# Patient Record
Sex: MALE | Race: WHITE | NOT HISPANIC OR LATINO | Employment: FULL TIME | URBAN - METROPOLITAN AREA
[De-identification: names, ages, dates, MRNs, and addresses within clinical notes are randomized per-mention and may not be internally consistent; named-entity substitution may affect disease eponyms.]

---

## 2019-09-05 ENCOUNTER — OFFICE VISIT (OUTPATIENT)
Dept: FAMILY MEDICINE CLINIC | Facility: CLINIC | Age: 39
End: 2019-09-05

## 2019-09-05 VITALS
BODY MASS INDEX: 44.1 KG/M2 | SYSTOLIC BLOOD PRESSURE: 128 MMHG | HEIGHT: 71 IN | DIASTOLIC BLOOD PRESSURE: 86 MMHG | WEIGHT: 315 LBS | OXYGEN SATURATION: 99 % | HEART RATE: 84 BPM | TEMPERATURE: 98.4 F

## 2019-09-05 DIAGNOSIS — Z13.1 SCREENING FOR DIABETES MELLITUS: ICD-10-CM

## 2019-09-05 DIAGNOSIS — L40.9 PSORIASIS: ICD-10-CM

## 2019-09-05 DIAGNOSIS — Z00.00 ROUTINE GENERAL MEDICAL EXAMINATION AT A HEALTH CARE FACILITY: Primary | ICD-10-CM

## 2019-09-05 DIAGNOSIS — Z13.0 SCREENING FOR IRON DEFICIENCY ANEMIA: ICD-10-CM

## 2019-09-05 DIAGNOSIS — Z13.6 SCREENING FOR ISCHEMIC HEART DISEASE: ICD-10-CM

## 2019-09-05 PROCEDURE — 99213 OFFICE O/P EST LOW 20 MIN: CPT | Performed by: NURSE PRACTITIONER

## 2019-09-05 PROCEDURE — 99385 PREV VISIT NEW AGE 18-39: CPT | Performed by: NURSE PRACTITIONER

## 2019-09-05 NOTE — PROGRESS NOTES
"Subjective   Kenny Pavon is a 39 y.o. male.     History of Present Illness   New patient to me.    Well Adult Physical: Patient here for a comprehensive physical exam.The patient reports spot on elbow  Do you take any herbs or supplements that were not prescribed by a doctor? no Are you taking calcium supplements? no Are you taking aspirin daily? no    Spot on right elbow that has been there for a few months.  No other spots never been there before. Lotion has not helped.  Dry white flaky area.     The following portions of the patient's history were reviewed and updated as appropriate: allergies, current medications, past social history and problem list.    Review of Systems   Skin: Positive for rash.   All other systems reviewed and are negative.      Objective   /86 (BP Location: Left arm, Patient Position: Sitting)   Pulse 84   Temp 98.4 °F (36.9 °C)   Ht 180.3 cm (71\")   Wt (!) 145 kg (320 lb)   SpO2 99%   BMI 44.63 kg/m²   Physical Exam   Constitutional: He is oriented to person, place, and time. He appears well-developed and well-nourished. No distress.   HENT:   Head: Normocephalic and atraumatic. Hair is normal.   Right Ear: Hearing, tympanic membrane, external ear and ear canal normal.   Left Ear: Hearing, tympanic membrane, external ear and ear canal normal.   Nose: No sinus tenderness or nasal deformity.   Mouth/Throat: Uvula is midline, oropharynx is clear and moist and mucous membranes are normal. No oral lesions. No uvula swelling.   Eyes: Conjunctivae, EOM and lids are normal. Pupils are equal, round, and reactive to light. Right eye exhibits no discharge. Left eye exhibits no discharge. No scleral icterus. Right eye exhibits normal extraocular motion and no nystagmus. Left eye exhibits normal extraocular motion and no nystagmus.   Neck: Normal range of motion. Neck supple. No JVD present. No thyromegaly present.   Cardiovascular: Normal rate, regular rhythm, normal heart sounds, " intact distal pulses and normal pulses. Exam reveals no gallop.   No murmur heard.  Pulmonary/Chest: Effort normal and breath sounds normal. No respiratory distress. He has no wheezes. He has no rales. He exhibits no tenderness.   Abdominal: Soft. Bowel sounds are normal. He exhibits no distension and no mass. There is no tenderness. There is no guarding. No hernia.   Musculoskeletal: Normal range of motion. He exhibits no edema, tenderness or deformity.   Lymphadenopathy:     He has no cervical adenopathy.   Neurological: He is alert and oriented to person, place, and time. He has normal reflexes. He displays normal reflexes. No cranial nerve deficit. He exhibits normal muscle tone. Coordination normal.   Skin: Skin is warm and dry. No rash noted. He is not diaphoretic.   Psychiatric: He has a normal mood and affect. His behavior is normal. Judgment and thought content normal.   Vitals reviewed.      Assessment/Plan      Diagnosis Plan   1. Routine general medical examination at a health care facility     2. Screening for iron deficiency anemia  CBC & Differential   3. Screening for diabetes mellitus  Comprehensive Metabolic Panel   4. Screening for ischemic heart disease  Lipid Panel With LDL / HDL Ratio   5. Psoriasis  triamcinolone (KENALOG) 0.1 % ointment     Discussed weight, diet and exercise  Follow up after labs      CARLOS MANUEL Domínguez  9/5/2019

## 2019-09-06 LAB
ALBUMIN SERPL-MCNC: 4.1 G/DL (ref 3.5–5.5)
ALBUMIN/GLOB SERPL: 1.4 {RATIO} (ref 1.2–2.2)
ALP SERPL-CCNC: 93 IU/L (ref 39–117)
ALT SERPL-CCNC: 28 IU/L (ref 0–44)
AST SERPL-CCNC: 20 IU/L (ref 0–40)
BASOPHILS # BLD AUTO: 0.1 X10E3/UL (ref 0–0.2)
BASOPHILS NFR BLD AUTO: 1 %
BILIRUB SERPL-MCNC: 0.5 MG/DL (ref 0–1.2)
BUN SERPL-MCNC: 8 MG/DL (ref 6–20)
BUN/CREAT SERPL: 9 (ref 9–20)
CALCIUM SERPL-MCNC: 10 MG/DL (ref 8.7–10.2)
CHLORIDE SERPL-SCNC: 99 MMOL/L (ref 96–106)
CHOLEST SERPL-MCNC: 206 MG/DL (ref 100–199)
CO2 SERPL-SCNC: 21 MMOL/L (ref 20–29)
CREAT SERPL-MCNC: 0.88 MG/DL (ref 0.76–1.27)
EOSINOPHIL # BLD AUTO: 0.1 X10E3/UL (ref 0–0.4)
EOSINOPHIL NFR BLD AUTO: 2 %
ERYTHROCYTE [DISTWIDTH] IN BLOOD BY AUTOMATED COUNT: 13.3 % (ref 12.3–15.4)
GLOBULIN SER CALC-MCNC: 2.9 G/DL (ref 1.5–4.5)
GLUCOSE SERPL-MCNC: 168 MG/DL (ref 65–99)
HCT VFR BLD AUTO: 49.4 % (ref 37.5–51)
HDLC SERPL-MCNC: 35 MG/DL
HGB BLD-MCNC: 16.9 G/DL (ref 13–17.7)
IMM GRANULOCYTES # BLD AUTO: 0 X10E3/UL (ref 0–0.1)
IMM GRANULOCYTES NFR BLD AUTO: 0 %
LDLC SERPL CALC-MCNC: 134 MG/DL (ref 0–99)
LDLC/HDLC SERPL: 3.8 RATIO (ref 0–3.6)
LYMPHOCYTES # BLD AUTO: 2 X10E3/UL (ref 0.7–3.1)
LYMPHOCYTES NFR BLD AUTO: 24 %
MCH RBC QN AUTO: 30.5 PG (ref 26.6–33)
MCHC RBC AUTO-ENTMCNC: 34.2 G/DL (ref 31.5–35.7)
MCV RBC AUTO: 89 FL (ref 79–97)
MONOCYTES # BLD AUTO: 0.4 X10E3/UL (ref 0.1–0.9)
MONOCYTES NFR BLD AUTO: 4 %
NEUTROPHILS # BLD AUTO: 6 X10E3/UL (ref 1.4–7)
NEUTROPHILS NFR BLD AUTO: 69 %
PLATELET # BLD AUTO: 287 X10E3/UL (ref 150–450)
POTASSIUM SERPL-SCNC: 4.2 MMOL/L (ref 3.5–5.2)
PROT SERPL-MCNC: 7 G/DL (ref 6–8.5)
RBC # BLD AUTO: 5.55 X10E6/UL (ref 4.14–5.8)
SODIUM SERPL-SCNC: 139 MMOL/L (ref 134–144)
TRIGL SERPL-MCNC: 185 MG/DL (ref 0–149)
VLDLC SERPL CALC-MCNC: 37 MG/DL (ref 5–40)
WBC # BLD AUTO: 8.7 X10E3/UL (ref 3.4–10.8)

## 2019-09-12 LAB
Lab: NORMAL
Lab: NORMAL

## 2019-09-17 LAB
HBA1C MFR BLD: 8.8 % (ref 4.8–5.6)
WRITTEN AUTHORIZATION: NORMAL

## 2019-09-24 ENCOUNTER — OFFICE VISIT (OUTPATIENT)
Dept: FAMILY MEDICINE CLINIC | Facility: CLINIC | Age: 39
End: 2019-09-24

## 2019-09-24 VITALS
WEIGHT: 313 LBS | HEIGHT: 71 IN | OXYGEN SATURATION: 98 % | TEMPERATURE: 98.3 F | BODY MASS INDEX: 43.82 KG/M2 | HEART RATE: 71 BPM | DIASTOLIC BLOOD PRESSURE: 70 MMHG | SYSTOLIC BLOOD PRESSURE: 124 MMHG

## 2019-09-24 DIAGNOSIS — E11.9 TYPE 2 DIABETES MELLITUS WITHOUT COMPLICATION, WITHOUT LONG-TERM CURRENT USE OF INSULIN (HCC): Primary | ICD-10-CM

## 2019-09-24 DIAGNOSIS — E78.5 HYPERLIPIDEMIA, UNSPECIFIED HYPERLIPIDEMIA TYPE: ICD-10-CM

## 2019-09-24 PROCEDURE — 99214 OFFICE O/P EST MOD 30 MIN: CPT | Performed by: NURSE PRACTITIONER

## 2019-09-24 RX ORDER — PAROXETINE HYDROCHLORIDE 20 MG/1
TABLET, FILM COATED ORAL
Refills: 3 | COMMUNITY
Start: 2019-09-03 | End: 2020-10-08

## 2019-09-24 RX ORDER — SIMVASTATIN 20 MG
20 TABLET ORAL NIGHTLY
Qty: 90 TABLET | Refills: 3 | Status: SHIPPED | OUTPATIENT
Start: 2019-09-24 | End: 2020-07-08 | Stop reason: SDUPTHER

## 2019-09-24 RX ORDER — BUSPIRONE HYDROCHLORIDE 7.5 MG/1
TABLET ORAL
Refills: 0 | COMMUNITY
Start: 2019-09-03 | End: 2022-01-31

## 2019-09-24 RX ORDER — METFORMIN HYDROCHLORIDE EXTENDED-RELEASE TABLETS 1000 MG/1
1000 TABLET, FILM COATED, EXTENDED RELEASE ORAL
Qty: 180 TABLET | Refills: 3 | Status: SHIPPED | OUTPATIENT
Start: 2019-09-24 | End: 2019-09-26 | Stop reason: CLARIF

## 2019-09-24 NOTE — PROGRESS NOTES
"Subjective   Kenny Pavon is a 39 y.o. male.     History of Present Illness   Patient presented to the office today to go over lab results an A1c of 8.8.  Diabetes does run in his family he admits to eating a very poor diet including multiple regular sodas and sweet tea multiple times a day.    The following portions of the patient's history were reviewed and updated as appropriate: allergies, current medications, past social history and problem list.    Review of Systems   Constitutional: Negative for fever.   Respiratory: Negative for cough and shortness of breath.    Cardiovascular: Negative for chest pain.   Gastrointestinal: Negative for abdominal pain.   Neurological: Negative for dizziness.       Objective   /70 (BP Location: Left arm, Patient Position: Sitting)   Pulse 71   Temp 98.3 °F (36.8 °C)   Ht 180.3 cm (70.98\")   Wt (!) 142 kg (313 lb)   SpO2 98%   BMI 43.67 kg/m²   Physical Exam   Constitutional: He is oriented to person, place, and time. Vital signs are normal. He appears well-developed and well-nourished. No distress.   HENT:   Head: Normocephalic.   Cardiovascular: Normal rate, regular rhythm and normal heart sounds.   Pulmonary/Chest: Effort normal and breath sounds normal.   Neurological: He is alert and oriented to person, place, and time. Gait normal.   Psychiatric: He has a normal mood and affect. His behavior is normal. Judgment and thought content normal.   Vitals reviewed.      Assessment/Plan      Diagnosis Plan   1. Type 2 diabetes mellitus without complication, without long-term current use of insulin (CMS/[facility])  metFORMIN (FORTAMET) 1000 MG (OSM) 24 hr tablet   2. Hyperlipidemia, unspecified hyperlipidemia type  simvastatin (ZOCOR) 20 MG tablet     rto in 3 mons   Discussed medications that I am starting today at length with patient.  Patient verbalized understanding.  Encouraged him to stop drinking regular sodas and sweet tea and also to exercise 30 minutes a day " for 5 days a week.  Also encouraged him to look on the [employer] website for tips on diet.    CARLOS MANUEL Domínguez  9/24/2019

## 2020-07-08 ENCOUNTER — OFFICE VISIT (OUTPATIENT)
Dept: FAMILY MEDICINE CLINIC | Facility: CLINIC | Age: 40
End: 2020-07-08

## 2020-07-08 VITALS
DIASTOLIC BLOOD PRESSURE: 74 MMHG | WEIGHT: 315 LBS | BODY MASS INDEX: 44.1 KG/M2 | HEIGHT: 71 IN | HEART RATE: 97 BPM | TEMPERATURE: 98.2 F | SYSTOLIC BLOOD PRESSURE: 118 MMHG | OXYGEN SATURATION: 98 %

## 2020-07-08 DIAGNOSIS — E78.5 HYPERLIPIDEMIA, UNSPECIFIED HYPERLIPIDEMIA TYPE: ICD-10-CM

## 2020-07-08 DIAGNOSIS — Z13.0 SCREENING FOR IRON DEFICIENCY ANEMIA: ICD-10-CM

## 2020-07-08 DIAGNOSIS — Z00.00 ROUTINE GENERAL MEDICAL EXAMINATION AT A HEALTH CARE FACILITY: Primary | ICD-10-CM

## 2020-07-08 DIAGNOSIS — E11.9 TYPE 2 DIABETES MELLITUS WITHOUT COMPLICATION, WITHOUT LONG-TERM CURRENT USE OF INSULIN (HCC): ICD-10-CM

## 2020-07-08 PROCEDURE — 99395 PREV VISIT EST AGE 18-39: CPT | Performed by: NURSE PRACTITIONER

## 2020-07-08 RX ORDER — SIMVASTATIN 20 MG
20 TABLET ORAL NIGHTLY
Qty: 90 TABLET | Refills: 3 | Status: SHIPPED | OUTPATIENT
Start: 2020-07-08 | End: 2021-07-19

## 2020-07-08 RX ORDER — TRAZODONE HYDROCHLORIDE 100 MG/1
100 TABLET ORAL NIGHTLY
COMMUNITY
Start: 2020-06-03 | End: 2022-05-03

## 2020-07-08 NOTE — PROGRESS NOTES
"Subjective   Kenny Pavon is a 40 y.o. male.     History of Present Illness   Well Adult Physical: Patient here for a comprehensive physical exam.The patient reports no problems  Do you take any herbs or supplements that were not prescribed by a doctor? no Are you taking calcium supplements? no Are you taking aspirin daily? no    Pt never returned after last visit for diabetes follow up last year.  Pt states that he is not taking meds as directed     The following portions of the patient's history were reviewed and updated as appropriate: allergies, current medications, past social history and problem list.    Review of Systems   Constitutional: Negative for fever.   Respiratory: Negative for cough and shortness of breath.    Cardiovascular: Negative for chest pain.   Gastrointestinal: Negative for abdominal pain.   Neurological: Negative for dizziness.       Objective   /74 (BP Location: Right arm, Patient Position: Sitting)   Pulse 97   Temp 98.2 °F (36.8 °C)   Ht 180.3 cm (70.98\")   Wt (!) 146 kg (322 lb)   SpO2 98%   BMI 44.93 kg/m²   Physical Exam   Constitutional: He is oriented to person, place, and time. He appears well-developed and well-nourished. No distress.   HENT:   Head: Normocephalic and atraumatic. Hair is normal.   Right Ear: Hearing, tympanic membrane, external ear and ear canal normal.   Left Ear: Hearing, tympanic membrane, external ear and ear canal normal.   Nose: No sinus tenderness or nasal deformity.   Mouth/Throat: Uvula is midline, oropharynx is clear and moist and mucous membranes are normal. No oral lesions. No uvula swelling.   Eyes: Pupils are equal, round, and reactive to light. Conjunctivae, EOM and lids are normal. Right eye exhibits no discharge. Left eye exhibits no discharge. No scleral icterus. Right eye exhibits normal extraocular motion and no nystagmus. Left eye exhibits normal extraocular motion and no nystagmus.   Neck: Normal range of motion. Neck " supple. No JVD present. No thyromegaly present.   Cardiovascular: Normal rate, regular rhythm, normal heart sounds, intact distal pulses and normal pulses. Exam reveals no gallop.   No murmur heard.  Pulmonary/Chest: Effort normal and breath sounds normal. No respiratory distress. He has no wheezes. He has no rales. He exhibits no tenderness.   Abdominal: Soft. Bowel sounds are normal. He exhibits no distension and no mass. There is no tenderness. There is no guarding. No hernia.   Musculoskeletal: Normal range of motion. He exhibits no edema, tenderness or deformity.   Lymphadenopathy:     He has no cervical adenopathy.   Neurological: He is alert and oriented to person, place, and time. He has normal reflexes. He displays normal reflexes. No cranial nerve deficit. He exhibits normal muscle tone. Coordination normal.   Skin: Skin is warm and dry. No rash noted. He is not diaphoretic.   Psychiatric: He has a normal mood and affect. His behavior is normal. Judgment and thought content normal.   Vitals reviewed.      Assessment/Plan      Diagnosis Plan   1. Routine general medical examination at a health care facility     2. Screening for iron deficiency anemia  CBC & Differential   3. Type 2 diabetes mellitus without complication, without long-term current use of insulin (CMS/[facility])  Comprehensive Metabolic Panel    Hemoglobin A1c    metFORMIN (Glucophage) 1000 MG tablet   4. Hyperlipidemia, unspecified hyperlipidemia type  Comprehensive Metabolic Panel    Lipid Panel With LDL / HDL Ratio    simvastatin (Zocor) 20 MG tablet     Discussed weight, diet and exercise  Follow up after labs    Discussed at length need of restarting meds and getting diabetes under control  rto in 3 toan Denson, APRN  7/8/2020

## 2020-07-09 LAB
ALBUMIN SERPL-MCNC: 4.2 G/DL (ref 3.5–5.2)
ALBUMIN/GLOB SERPL: 1.6 G/DL
ALP SERPL-CCNC: 95 U/L (ref 39–117)
ALT SERPL-CCNC: 82 U/L (ref 1–41)
AST SERPL-CCNC: 82 U/L (ref 1–40)
BASOPHILS # BLD AUTO: 0.06 10*3/MM3 (ref 0–0.2)
BASOPHILS NFR BLD AUTO: 0.9 % (ref 0–1.5)
BILIRUB SERPL-MCNC: 0.5 MG/DL (ref 0–1.2)
BUN SERPL-MCNC: 11 MG/DL (ref 6–20)
BUN/CREAT SERPL: 13.8 (ref 7–25)
CALCIUM SERPL-MCNC: 9.7 MG/DL (ref 8.6–10.5)
CHLORIDE SERPL-SCNC: 97 MMOL/L (ref 98–107)
CHOLEST SERPL-MCNC: 218 MG/DL (ref 0–200)
CO2 SERPL-SCNC: 25 MMOL/L (ref 22–29)
CREAT SERPL-MCNC: 0.8 MG/DL (ref 0.76–1.27)
EOSINOPHIL # BLD AUTO: 0.1 10*3/MM3 (ref 0–0.4)
EOSINOPHIL NFR BLD AUTO: 1.5 % (ref 0.3–6.2)
ERYTHROCYTE [DISTWIDTH] IN BLOOD BY AUTOMATED COUNT: 12 % (ref 12.3–15.4)
GLOBULIN SER CALC-MCNC: 2.7 GM/DL
GLUCOSE SERPL-MCNC: 326 MG/DL (ref 65–99)
HBA1C MFR BLD: 11 % (ref 4.8–5.6)
HCT VFR BLD AUTO: 49.4 % (ref 37.5–51)
HDLC SERPL-MCNC: 35 MG/DL (ref 40–60)
HGB BLD-MCNC: 17.1 G/DL (ref 13–17.7)
IMM GRANULOCYTES # BLD AUTO: 0.02 10*3/MM3 (ref 0–0.05)
IMM GRANULOCYTES NFR BLD AUTO: 0.3 % (ref 0–0.5)
LDLC SERPL CALC-MCNC: 126 MG/DL (ref 0–100)
LDLC/HDLC SERPL: 3.6 {RATIO}
LYMPHOCYTES # BLD AUTO: 1.23 10*3/MM3 (ref 0.7–3.1)
LYMPHOCYTES NFR BLD AUTO: 19 % (ref 19.6–45.3)
MCH RBC QN AUTO: 31 PG (ref 26.6–33)
MCHC RBC AUTO-ENTMCNC: 34.6 G/DL (ref 31.5–35.7)
MCV RBC AUTO: 89.5 FL (ref 79–97)
MONOCYTES # BLD AUTO: 0.35 10*3/MM3 (ref 0.1–0.9)
MONOCYTES NFR BLD AUTO: 5.4 % (ref 5–12)
NEUTROPHILS # BLD AUTO: 4.7 10*3/MM3 (ref 1.7–7)
NEUTROPHILS NFR BLD AUTO: 72.9 % (ref 42.7–76)
NRBC BLD AUTO-RTO: 0 /100 WBC (ref 0–0.2)
PLATELET # BLD AUTO: 270 10*3/MM3 (ref 140–450)
POTASSIUM SERPL-SCNC: 4.4 MMOL/L (ref 3.5–5.2)
PROT SERPL-MCNC: 6.9 G/DL (ref 6–8.5)
RBC # BLD AUTO: 5.52 10*6/MM3 (ref 4.14–5.8)
SODIUM SERPL-SCNC: 133 MMOL/L (ref 136–145)
TRIGL SERPL-MCNC: 285 MG/DL (ref 0–150)
VLDLC SERPL CALC-MCNC: 57 MG/DL
WBC # BLD AUTO: 6.46 10*3/MM3 (ref 3.4–10.8)

## 2020-07-13 ENCOUNTER — TELEPHONE (OUTPATIENT)
Dept: FAMILY MEDICINE CLINIC | Facility: CLINIC | Age: 40
End: 2020-07-13

## 2020-07-17 ENCOUNTER — OFFICE VISIT (OUTPATIENT)
Dept: FAMILY MEDICINE CLINIC | Facility: CLINIC | Age: 40
End: 2020-07-17

## 2020-07-17 VITALS
WEIGHT: 315 LBS | SYSTOLIC BLOOD PRESSURE: 150 MMHG | HEART RATE: 89 BPM | DIASTOLIC BLOOD PRESSURE: 84 MMHG | HEIGHT: 71 IN | OXYGEN SATURATION: 99 % | TEMPERATURE: 97.8 F | BODY MASS INDEX: 44.1 KG/M2

## 2020-07-17 DIAGNOSIS — E78.5 HYPERLIPIDEMIA, UNSPECIFIED HYPERLIPIDEMIA TYPE: ICD-10-CM

## 2020-07-17 DIAGNOSIS — E11.9 TYPE 2 DIABETES MELLITUS WITHOUT COMPLICATION, WITHOUT LONG-TERM CURRENT USE OF INSULIN (HCC): Primary | ICD-10-CM

## 2020-07-17 DIAGNOSIS — Z78.9 BODY PIERCING: ICD-10-CM

## 2020-07-17 PROCEDURE — 99214 OFFICE O/P EST MOD 30 MIN: CPT | Performed by: NURSE PRACTITIONER

## 2020-07-17 RX ORDER — CEPHALEXIN 500 MG/1
500 CAPSULE ORAL 3 TIMES DAILY
Qty: 30 CAPSULE | Refills: 0 | Status: SHIPPED | OUTPATIENT
Start: 2020-07-17 | End: 2020-10-08

## 2020-07-17 NOTE — PROGRESS NOTES
"Subjective   Kenny Pavon is a 40 y.o. male.     History of Present Illness   Patient presenting back to the office today to discuss lab work and A1c of 11.  He is currently using metformin 1000 mg twice a day as directed he was off the medication for a little while also he is using his simvastatin 20 mg daily now but he was off of that for a while as well.  I discussed his medication options with him and we have decided to add 2 of those medications.    Patient several years ago had the head of his penis pierced and occasionally will get red and irritated is been red and irritated for a week now and is unable to get it to clear and is also very painful patient states there is no discharge from the piercing.    The following portions of the patient's history were reviewed and updated as appropriate: allergies, current medications, past social history and problem list.    Review of Systems   Constitutional: Negative for fever.   Respiratory: Negative for cough and shortness of breath.    Cardiovascular: Negative for chest pain.   Gastrointestinal: Negative for abdominal pain.   Neurological: Negative for dizziness.       Objective   /84   Pulse 89   Temp 97.8 °F (36.6 °C)   Ht 180.3 cm (70.98\")   Wt (!) 146 kg (322 lb)   SpO2 99%   BMI 44.93 kg/m²   Physical Exam   Constitutional: He is oriented to person, place, and time. Vital signs are normal. He appears well-developed and well-nourished. No distress.   HENT:   Head: Normocephalic.   Cardiovascular: Normal rate, regular rhythm and normal heart sounds.   Pulmonary/Chest: Effort normal and breath sounds normal.   Neurological: He is alert and oriented to person, place, and time. Gait normal.   Psychiatric: He has a normal mood and affect. His behavior is normal. Judgment and thought content normal.   Vitals reviewed.      Assessment/Plan      Diagnosis Plan   1. Type 2 diabetes mellitus without complication, without long-term current use of " insulin (CMS/HCC)  SITagliptin (Januvia) 100 MG tablet    Dapagliflozin Propanediol (Farxiga) 10 MG tablet   2. Hyperlipidemia, unspecified hyperlipidemia type     3. Body piercing  cephalexin (Keflex) 500 MG capsule   Continue same with cholesterol medication recheck in 6 months  Continue same with metformin, add for CIGA 10 mg, add Januvia 100 mg return to the office in 3 months for an A1c check.  We discussed diet and exercise and low-carb patient verbalized understanding.    Discussed that the piercing will possibly have to be removed infection is not cleared with antibiotic  Eladio Denson, CARLOS MANUEL  7/17/2020

## 2020-07-21 ENCOUNTER — PRIOR AUTHORIZATION (OUTPATIENT)
Dept: FAMILY MEDICINE CLINIC | Facility: CLINIC | Age: 40
End: 2020-07-21

## 2020-10-08 ENCOUNTER — OFFICE VISIT (OUTPATIENT)
Dept: FAMILY MEDICINE CLINIC | Facility: CLINIC | Age: 40
End: 2020-10-08

## 2020-10-08 VITALS
OXYGEN SATURATION: 98 % | TEMPERATURE: 96.9 F | BODY MASS INDEX: 43.03 KG/M2 | DIASTOLIC BLOOD PRESSURE: 66 MMHG | WEIGHT: 307.4 LBS | SYSTOLIC BLOOD PRESSURE: 130 MMHG | HEIGHT: 71 IN | HEART RATE: 94 BPM

## 2020-10-08 DIAGNOSIS — Z78.9 BODY PIERCING: ICD-10-CM

## 2020-10-08 DIAGNOSIS — E78.5 HYPERLIPIDEMIA, UNSPECIFIED HYPERLIPIDEMIA TYPE: ICD-10-CM

## 2020-10-08 DIAGNOSIS — E11.9 TYPE 2 DIABETES MELLITUS WITHOUT COMPLICATION, WITHOUT LONG-TERM CURRENT USE OF INSULIN (HCC): Primary | ICD-10-CM

## 2020-10-08 DIAGNOSIS — L40.9 PSORIASIS: ICD-10-CM

## 2020-10-08 LAB — HBA1C MFR BLD: 11.6 %

## 2020-10-08 PROCEDURE — 99214 OFFICE O/P EST MOD 30 MIN: CPT | Performed by: NURSE PRACTITIONER

## 2020-10-08 PROCEDURE — 83036 HEMOGLOBIN GLYCOSYLATED A1C: CPT | Performed by: NURSE PRACTITIONER

## 2020-10-08 RX ORDER — INSULIN DEGLUDEC 200 U/ML
40 INJECTION, SOLUTION SUBCUTANEOUS NIGHTLY
Qty: 8 PEN | Refills: 3 | Status: SHIPPED | OUTPATIENT
Start: 2020-10-08 | End: 2021-10-29 | Stop reason: SDUPTHER

## 2020-10-08 RX ORDER — CEPHALEXIN 500 MG/1
500 CAPSULE ORAL 3 TIMES DAILY
Qty: 30 CAPSULE | Refills: 0 | Status: SHIPPED | OUTPATIENT
Start: 2020-10-08 | End: 2021-01-13

## 2020-10-08 RX ORDER — PAROXETINE 30 MG/1
TABLET, FILM COATED ORAL DAILY
COMMUNITY
Start: 2020-09-20 | End: 2022-05-03

## 2020-10-08 NOTE — PROGRESS NOTES
"Subjective   Kenny Pavon is a 40 y.o. male.     History of Present Illness    Since the last visit, he has overall felt well.  He has DMII not controlled on current regimen and will add/adjust medication, work on diet and exercise, get follow up labs and he has lost 17 pounds and stopped soft drinks, feeling better but a1c is higher than last check which concerns me about type 1 diabetes, taking cholesterol med and will recheck in 3 mons, psoriasis not improved with medication.  he has been compliant with current medications have reviewed them.  The patient denies medication side effects.  Will refill medications. /66   Pulse 94   Temp 96.9 °F (36.1 °C)   Ht 180.3 cm (70.98\")   Wt (!) 139 kg (307 lb 6.4 oz)   SpO2 98%   BMI 42.89 kg/m²     Results for orders placed or performed in visit on 07/08/20   Comprehensive Metabolic Panel    Specimen: Blood   Result Value Ref Range    Glucose 326 (H) 65 - 99 mg/dL    BUN 11 6 - 20 mg/dL    Creatinine 0.80 0.76 - 1.27 mg/dL    eGFR Non African Am 108 >60 mL/min/1.73    eGFR African Am 130 >60 mL/min/1.73    BUN/Creatinine Ratio 13.8 7.0 - 25.0    Sodium 133 (L) 136 - 145 mmol/L    Potassium 4.4 3.5 - 5.2 mmol/L    Chloride 97 (L) 98 - 107 mmol/L    Total CO2 25.0 22.0 - 29.0 mmol/L    Calcium 9.7 8.6 - 10.5 mg/dL    Total Protein 6.9 6.0 - 8.5 g/dL    Albumin 4.20 3.50 - 5.20 g/dL    Globulin 2.7 gm/dL    A/G Ratio 1.6 g/dL    Total Bilirubin 0.5 0.0 - 1.2 mg/dL    Alkaline Phosphatase 95 39 - 117 U/L    AST (SGOT) 82 (H) 1 - 40 U/L    ALT (SGPT) 82 (H) 1 - 41 U/L   Lipid Panel With LDL / HDL Ratio    Specimen: Blood   Result Value Ref Range    Total Cholesterol 218 (H) 0 - 200 mg/dL    Triglycerides 285 (H) 0 - 150 mg/dL    HDL Cholesterol 35 (L) 40 - 60 mg/dL    VLDL Cholesterol 57 mg/dL    LDL Cholesterol  126 (H) 0 - 100 mg/dL    LDL/HDL Ratio 3.60    Hemoglobin A1c    Specimen: Blood   Result Value Ref Range    Hemoglobin A1C 11.00 (H) 4.80 - 5.60 % " "  CBC & Differential    Specimen: Blood   Result Value Ref Range    WBC 6.46 3.40 - 10.80 10*3/mm3    RBC 5.52 4.14 - 5.80 10*6/mm3    Hemoglobin 17.1 13.0 - 17.7 g/dL    Hematocrit 49.4 37.5 - 51.0 %    MCV 89.5 79.0 - 97.0 fL    MCH 31.0 26.6 - 33.0 pg    MCHC 34.6 31.5 - 35.7 g/dL    RDW 12.0 (L) 12.3 - 15.4 %    Platelets 270 140 - 450 10*3/mm3    Neutrophil Rel % 72.9 42.7 - 76.0 %    Lymphocyte Rel % 19.0 (L) 19.6 - 45.3 %    Monocyte Rel % 5.4 5.0 - 12.0 %    Eosinophil Rel % 1.5 0.3 - 6.2 %    Basophil Rel % 0.9 0.0 - 1.5 %    Neutrophils Absolute 4.70 1.70 - 7.00 10*3/mm3    Lymphocytes Absolute 1.23 0.70 - 3.10 10*3/mm3    Monocytes Absolute 0.35 0.10 - 0.90 10*3/mm3    Eosinophils Absolute 0.10 0.00 - 0.40 10*3/mm3    Basophils Absolute 0.06 0.00 - 0.20 10*3/mm3    Immature Granulocyte Rel % 0.3 0.0 - 0.5 %    Immature Grans Absolute 0.02 0.00 - 0.05 10*3/mm3    nRBC 0.0 0.0 - 0.2 /100 WBC         The following portions of the patient's history were reviewed and updated as appropriate: allergies, current medications, past social history and problem list.    Review of Systems   Constitutional: Negative for fever.   Respiratory: Negative for cough and shortness of breath.    Cardiovascular: Negative for chest pain.   Gastrointestinal: Negative for abdominal pain.   Skin:        Dry skin   Neurological: Negative for dizziness.       Objective   /66   Pulse 94   Temp 96.9 °F (36.1 °C)   Ht 180.3 cm (70.98\")   Wt (!) 139 kg (307 lb 6.4 oz)   SpO2 98%   BMI 42.89 kg/m²   Physical Exam  Vitals signs reviewed.   Constitutional:       General: He is not in acute distress.     Appearance: He is well-developed.   HENT:      Head: Normocephalic.   Cardiovascular:      Rate and Rhythm: Normal rate and regular rhythm.      Heart sounds: Normal heart sounds.   Pulmonary:      Effort: Pulmonary effort is normal.      Breath sounds: Normal breath sounds.   Neurological:      Mental Status: He is alert and " oriented to person, place, and time.      Gait: Gait normal.   Psychiatric:         Behavior: Behavior normal.         Thought Content: Thought content normal.         Judgment: Judgment normal.         Assessment/Plan      Diagnosis Plan   1. Type 2 diabetes mellitus without complication, without long-term current use of insulin (CMS/[facility])  POC Glycosylated Hemoglobin (Hb A1C)    C-Peptide    Insulin Degludec (Tresiba FlexTouch) 200 UNIT/ML solution pen-injector pen injection   2. Hyperlipidemia, unspecified hyperlipidemia type     3. Psoriasis  Ambulatory Referral to Dermatology   4. Body piercing  cephalexin (Keflex) 500 MG capsule     referal to derm  Cont with meds and add insulin  Follow up after lab  rto in 3 mons     Mask and googles worn    Eladio Denson, APRN  10/8/2020

## 2020-10-09 LAB — C PEPTIDE SERPL-MCNC: 7.8 NG/ML (ref 1.1–4.4)

## 2020-10-14 ENCOUNTER — OFFICE VISIT (OUTPATIENT)
Dept: FAMILY MEDICINE CLINIC | Facility: CLINIC | Age: 40
End: 2020-10-14

## 2020-10-14 VITALS
OXYGEN SATURATION: 98 % | SYSTOLIC BLOOD PRESSURE: 136 MMHG | BODY MASS INDEX: 42.98 KG/M2 | WEIGHT: 307 LBS | TEMPERATURE: 98.4 F | HEART RATE: 96 BPM | DIASTOLIC BLOOD PRESSURE: 70 MMHG | HEIGHT: 71 IN

## 2020-10-14 DIAGNOSIS — E11.9 TYPE 2 DIABETES MELLITUS WITHOUT COMPLICATION, WITHOUT LONG-TERM CURRENT USE OF INSULIN (HCC): Primary | ICD-10-CM

## 2020-10-14 PROCEDURE — 99213 OFFICE O/P EST LOW 20 MIN: CPT | Performed by: NURSE PRACTITIONER

## 2020-10-14 NOTE — PROGRESS NOTES
"Subjective   Kenny Pavon is a 40 y.o. male.   Chief Complaint   Patient presents with   • Diabetes     Patient is here to f/u on labs results and medications ( wore mask and goggles)        History of Present Illness   Patient presenting back to the office today with confusion regarding his diabetic medications.  He is unable to afford the Tresiba which was called in for him on the last visit is over $800.  I did give him 2 pens of that so he is okay for now.  I have advised him to call his insurance and ask what alternative insulin that they will pay for and is on his formulary and he will get back to me with this information.  Also at last visit visit I was under the impression that he was taking his metformin and his Farxiga but today he states he is not taking the Farxiga at all because it was too expensive.  At the time that prescription was prescribed he was not given a co-pay card nor has he had one since.    The following portions of the patient's history were reviewed and updated as appropriate: allergies, current medications, past social history and problem list.    Review of Systems   Constitutional: Negative for fever.   Respiratory: Negative for cough and shortness of breath.    Cardiovascular: Negative for chest pain.   Gastrointestinal: Negative for abdominal pain.   Neurological: Negative for dizziness.       Objective   /70   Pulse 96   Temp 98.4 °F (36.9 °C)   Ht 180.3 cm (70.98\")   Wt (!) 139 kg (307 lb)   SpO2 98%   BMI 42.84 kg/m²   Physical Exam  Vitals signs reviewed.   Constitutional:       General: He is not in acute distress.     Appearance: He is well-developed.   HENT:      Head: Normocephalic.   Cardiovascular:      Rate and Rhythm: Normal rate and regular rhythm.      Heart sounds: Normal heart sounds.   Pulmonary:      Effort: Pulmonary effort is normal.      Breath sounds: Normal breath sounds.   Neurological:      Mental Status: He is alert and oriented to person, " place, and time.      Gait: Gait normal.   Psychiatric:         Behavior: Behavior normal.         Thought Content: Thought content normal.         Judgment: Judgment normal.         Assessment/Plan      Diagnosis Plan   1. Type 2 diabetes mellitus without complication, without long-term current use of insulin (CMS/[facility])       Continue with metformin 1000 mg twice a day.  For [employer]IGA 10 mg I gave him a month of samples plus a co-pay card where this should cost him $0 a month.  Call insurance to see what type of insulin is on his formulary and then call the office so we can get this medication sent into his pharmacy  Return to the office in 3 months for an A1c check or call with any other issues  Mask and googles worn    CARLOS MANUEL Domínguez  10/14/2020

## 2021-01-13 ENCOUNTER — OFFICE VISIT (OUTPATIENT)
Dept: FAMILY MEDICINE CLINIC | Facility: CLINIC | Age: 41
End: 2021-01-13

## 2021-01-13 VITALS
HEART RATE: 81 BPM | BODY MASS INDEX: 40.88 KG/M2 | OXYGEN SATURATION: 96 % | TEMPERATURE: 97.3 F | WEIGHT: 292 LBS | HEIGHT: 71 IN | SYSTOLIC BLOOD PRESSURE: 148 MMHG | DIASTOLIC BLOOD PRESSURE: 80 MMHG

## 2021-01-13 DIAGNOSIS — F41.9 ANXIETY: ICD-10-CM

## 2021-01-13 DIAGNOSIS — E11.9 TYPE 2 DIABETES MELLITUS WITHOUT COMPLICATION, WITHOUT LONG-TERM CURRENT USE OF INSULIN (HCC): Primary | ICD-10-CM

## 2021-01-13 DIAGNOSIS — E78.5 HYPERLIPIDEMIA, UNSPECIFIED HYPERLIPIDEMIA TYPE: Chronic | ICD-10-CM

## 2021-01-13 LAB — HBA1C MFR BLD: 11.1 %

## 2021-01-13 PROCEDURE — 99214 OFFICE O/P EST MOD 30 MIN: CPT | Performed by: NURSE PRACTITIONER

## 2021-01-13 PROCEDURE — 83036 HEMOGLOBIN GLYCOSYLATED A1C: CPT | Performed by: NURSE PRACTITIONER

## 2021-01-13 NOTE — PROGRESS NOTES
"Chief Complaint  Diabetes (Patient is needing his a1c checked ( wore mask and goggles) )    Subjective          Kenny Pavon presents to [facility] for   History of Present Illness  #1 diabetes-I saw this patient in October for diabetes with an A1c of over 11.  At that time I continued him on the Metformin added Farxiga and added Tresiba 40 units at night.  C-peptide at that time was 7.8.  Pt is not taking Farxiga bc of cost.  Patient has lost weight since the last visit.  He is doing very well with diet and starting to exercise and proud of him for this but his A1c today is still 11.1.    #2 hyperlipidemia-cholesterol was elevated he was started on simvastatin 20 mg a day patient taking medication daily as directed without any side effects.      Objective   Vital Signs:   /80   Pulse 81   Temp 97.3 °F (36.3 °C)   Ht 180.3 cm (70.98\")   Wt 132 kg (292 lb)   SpO2 96%   BMI 40.74 kg/m²     Physical Exam  Vitals signs reviewed.   Constitutional:       General: He is not in acute distress.     Appearance: He is well-developed.   HENT:      Head: Normocephalic.   Cardiovascular:      Rate and Rhythm: Normal rate and regular rhythm.      Heart sounds: Normal heart sounds.   Pulmonary:      Effort: Pulmonary effort is normal.      Breath sounds: Normal breath sounds.   Neurological:      Mental Status: He is alert and oriented to person, place, and time.      Gait: Gait normal.   Psychiatric:         Behavior: Behavior normal.         Thought Content: Thought content normal.         Judgment: Judgment normal.        Result Review :   The following data was reviewed by: CARLOS MANUEL Domínguez on 01/13/2021:  CMP    CMP 7/8/20   Glucose 326 (A)   BUN 11   Creatinine 0.80   eGFR Non  Am 108   eGFR African Am 130   Sodium 133 (A)   Potassium 4.4   Chloride 97 (A)   Calcium 9.7   Total Protein 6.9   Albumin 4.20   Globulin 2.7   Total Bilirubin 0.5   Alkaline Phosphatase " 95   AST (SGOT) 82 (A)   ALT (SGPT) 82 (A)   (A) Abnormal value            CBC w/diff    CBC w/Diff 7/8/20   WBC 6.46   RBC 5.52   Hemoglobin 17.1   Hematocrit 49.4   MCV 89.5   MCH 31.0   MCHC 34.6   RDW 12.0 (A)   Platelets 270   Neutrophil Rel % 72.9   Lymphocyte Rel % 19.0 (A)   Monocyte Rel % 5.4   Eosinophil Rel % 1.5   Basophil Rel % 0.9   (A) Abnormal value            Lipid Panel    Lipid Panel 7/8/20   Total Cholesterol 218 (A)   Triglycerides 285 (A)   HDL Cholesterol 35 (A)   VLDL Cholesterol 57   LDL Cholesterol  126 (A)   LDL/HDL Ratio 3.60   (A) Abnormal value                              Assessment and Plan    Problem List Items Addressed This Visit        Cardiac and Vasculature    Hyperlipidemia (Chronic)       Endocrine and Metabolic    Type 2 diabetes mellitus without complication, without long-term current use of insulin (CMS/[facility]) - Primary (Chronic)    Relevant Medications    SITagliptin (Januvia) 100 MG tablet    Other Relevant Orders    POC Glycosylated Hemoglobin (Hb A1C) (Completed)          Follow Up   No follow-ups on file.  Patient was given instructions and counseling regarding his condition or for health maintenance advice. Please see specific information pulled into the AVS if appropriate.     Adding januvia, increase Tresiba to 50 units at night  rto in 3 mons  Talked at length with patient regarding how great he is done on his weight loss to continue with that and not to get discouraged.  Also talk with him that we can get his A1c down to 9 in the next 3 months and can have to add insulin multiple times a day.  Patient verbalized understanding and agreed with plan of care.

## 2021-04-26 ENCOUNTER — OFFICE VISIT (OUTPATIENT)
Dept: FAMILY MEDICINE CLINIC | Facility: CLINIC | Age: 41
End: 2021-04-26

## 2021-04-26 VITALS
HEART RATE: 76 BPM | WEIGHT: 298 LBS | OXYGEN SATURATION: 98 % | HEIGHT: 71 IN | DIASTOLIC BLOOD PRESSURE: 70 MMHG | TEMPERATURE: 97 F | BODY MASS INDEX: 41.72 KG/M2 | SYSTOLIC BLOOD PRESSURE: 136 MMHG

## 2021-04-26 DIAGNOSIS — E11.9 TYPE 2 DIABETES MELLITUS WITHOUT COMPLICATION, WITHOUT LONG-TERM CURRENT USE OF INSULIN (HCC): Primary | ICD-10-CM

## 2021-04-26 DIAGNOSIS — E78.5 HYPERLIPIDEMIA, UNSPECIFIED HYPERLIPIDEMIA TYPE: Chronic | ICD-10-CM

## 2021-04-26 LAB — HBA1C MFR BLD: 13.8 %

## 2021-04-26 PROCEDURE — 99214 OFFICE O/P EST MOD 30 MIN: CPT | Performed by: NURSE PRACTITIONER

## 2021-04-26 PROCEDURE — 83036 HEMOGLOBIN GLYCOSYLATED A1C: CPT | Performed by: NURSE PRACTITIONER

## 2021-04-26 NOTE — PROGRESS NOTES
"Chief Complaint  Diabetes (Patient is needing his a1c checked ( wore mask and goggles) )    Subjective          Kenny Pavon presents to [facility]  History of Present Illness  1 diabetes-patient was last seen in January 2021 for diabetes with an A1c of 11.1.  He is currently prescribed metformin 1000 mg twice a day, Farxiga 10 mg once a day, Januvia 100 mg once a day and Tresiba 40 units at night.  Today's A1c is 13.8. He states that he is only on Farxiga and Metformin.  He has stopped Januvia and insulin. He is non compliant bc of cost of meds.  He states that he has a high deductible medical plan and his insulins are cost him up to $1500 a month and he cannot afford that.  He states the Metformin and the Farxiga are affordable so that is the only reason why he is been able to do those.    #2 hyperlipidemia-patient is currently taking simvastatin 20 mg daily as directed without any side effects.  His cholesterol was last checked in July 2020 with an elevated LDL and he was not on medication at that time.  He is not fasting today.      Objective   Vital Signs:   /70   Pulse 76   Temp 97 °F (36.1 °C)   Ht 180.3 cm (70.98\")   Wt 135 kg (298 lb)   SpO2 98%   BMI 41.58 kg/m²     Physical Exam  Vitals reviewed.   Constitutional:       General: He is not in acute distress.     Appearance: He is well-developed.   HENT:      Head: Normocephalic.   Cardiovascular:      Rate and Rhythm: Normal rate and regular rhythm.      Heart sounds: Normal heart sounds.   Pulmonary:      Effort: Pulmonary effort is normal.      Breath sounds: Normal breath sounds.   Neurological:      Mental Status: He is alert and oriented to person, place, and time.      Gait: Gait normal.   Psychiatric:         Behavior: Behavior normal.         Thought Content: Thought content normal.         Judgment: Judgment normal.        Result Review :                 Assessment and Plan    Diagnoses and all " orders for this visit:    1. Type 2 diabetes mellitus without complication, without long-term current use of insulin (CMS/[facility]) (Primary)  -     POC Glycosylated Hemoglobin (Hb A1C)    2. Hyperlipidemia, unspecified hyperlipidemia type        Follow Up   No follow-ups on file.  Patient was given instructions and counseling regarding his condition or for health maintenance advice. Please see specific information pulled into the AVS if appropriate.     I discussed diabetes and his A1c being out of control at length with the patient.  I have decided for him to continue on the Metformin 1000 mg twice a day and the Farxiga 10 mg once a day and I have given him 15 samples of Tresiba or I would like for him to do 50 units at night.  Also I think that it would be best for him to be referred out to endocrinologist because they have better resources for him in regards to his uncontrolled diabetes.  I also stressed to him that if he runs out of his medications he can always call the office for samples.    Return to the office in 3 months fasting for lab work    Mask and googles worn

## 2021-07-17 DIAGNOSIS — E78.5 HYPERLIPIDEMIA, UNSPECIFIED HYPERLIPIDEMIA TYPE: ICD-10-CM

## 2021-07-17 DIAGNOSIS — E11.9 TYPE 2 DIABETES MELLITUS WITHOUT COMPLICATION, WITHOUT LONG-TERM CURRENT USE OF INSULIN (HCC): ICD-10-CM

## 2021-07-19 RX ORDER — SIMVASTATIN 20 MG
20 TABLET ORAL NIGHTLY
Qty: 90 TABLET | Refills: 3 | Status: SHIPPED | OUTPATIENT
Start: 2021-07-19 | End: 2021-10-29 | Stop reason: SDUPTHER

## 2021-10-29 ENCOUNTER — OFFICE VISIT (OUTPATIENT)
Dept: FAMILY MEDICINE CLINIC | Facility: CLINIC | Age: 41
End: 2021-10-29

## 2021-10-29 VITALS
HEIGHT: 71 IN | WEIGHT: 295 LBS | SYSTOLIC BLOOD PRESSURE: 128 MMHG | BODY MASS INDEX: 41.3 KG/M2 | OXYGEN SATURATION: 98 % | TEMPERATURE: 98 F | HEART RATE: 95 BPM | DIASTOLIC BLOOD PRESSURE: 82 MMHG

## 2021-10-29 DIAGNOSIS — E11.65 TYPE 2 DIABETES MELLITUS WITH HYPERGLYCEMIA, WITHOUT LONG-TERM CURRENT USE OF INSULIN (HCC): Primary | ICD-10-CM

## 2021-10-29 DIAGNOSIS — Z91.199 MEDICALLY NONCOMPLIANT: ICD-10-CM

## 2021-10-29 DIAGNOSIS — Z13.0 SCREENING FOR IRON DEFICIENCY ANEMIA: ICD-10-CM

## 2021-10-29 DIAGNOSIS — F41.9 ANXIETY: ICD-10-CM

## 2021-10-29 DIAGNOSIS — E78.5 HYPERLIPIDEMIA, UNSPECIFIED HYPERLIPIDEMIA TYPE: Chronic | ICD-10-CM

## 2021-10-29 DIAGNOSIS — Z23 NEED FOR VACCINATION: ICD-10-CM

## 2021-10-29 DIAGNOSIS — Z12.5 SPECIAL SCREENING FOR MALIGNANT NEOPLASM OF PROSTATE: ICD-10-CM

## 2021-10-29 LAB
EXPIRATION DATE: ABNORMAL
HBA1C MFR BLD: 13.5 %
Lab: ABNORMAL

## 2021-10-29 PROCEDURE — 83036 HEMOGLOBIN GLYCOSYLATED A1C: CPT | Performed by: NURSE PRACTITIONER

## 2021-10-29 PROCEDURE — 99214 OFFICE O/P EST MOD 30 MIN: CPT | Performed by: NURSE PRACTITIONER

## 2021-10-29 PROCEDURE — 36416 COLLJ CAPILLARY BLOOD SPEC: CPT | Performed by: NURSE PRACTITIONER

## 2021-10-29 PROCEDURE — 90471 IMMUNIZATION ADMIN: CPT | Performed by: NURSE PRACTITIONER

## 2021-10-29 PROCEDURE — 90686 IIV4 VACC NO PRSV 0.5 ML IM: CPT | Performed by: NURSE PRACTITIONER

## 2021-10-29 RX ORDER — SIMVASTATIN 20 MG
20 TABLET ORAL NIGHTLY
Qty: 90 TABLET | Refills: 3 | Status: SHIPPED | OUTPATIENT
Start: 2021-10-29 | End: 2021-11-16

## 2021-10-29 RX ORDER — INSULIN GLARGINE 300 U/ML
40 INJECTION, SOLUTION SUBCUTANEOUS NIGHTLY
Qty: 12 PEN | Refills: 3 | Status: SHIPPED | OUTPATIENT
Start: 2021-10-29 | End: 2022-01-31 | Stop reason: SDUPTHER

## 2021-10-29 NOTE — PROGRESS NOTES
"Chief Complaint  Diabetes Mellitus (follow up not doing well being without some medication for little while )    Subjective          Kenny Pavon presents to [facility]  History of Present Illness  Hyperlipidemia-patient is supposed to be on simvastatin 20 mg daily but has been out for months. Cholesterol was last checked in July 2020 with a total cholesterol of 218 and LDL of 126.    Diabetes-patient is supposed to be on metformin 1000 mg twice a day Farxiga 10 mg and Januvia 100 mg daily and Tresiba 40 units but he has been out of meds for mouths.  a1c today is 13.5 and in April 2021 it was 11.1.    Anxiety-patient is currently on Paxil 30 mg daily as directed with any side effects and BuSpar 7.5 mg on a as needed basis.  His anxiety at this time is well controlled. Seeing specialist    Objective   Vital Signs:   /82   Pulse 95   Temp 98 °F (36.7 °C)   Ht 180.3 cm (70.98\")   Wt 134 kg (295 lb)   SpO2 98%   BMI 41.17 kg/m²     Physical Exam  Vitals reviewed.   Constitutional:       General: He is not in acute distress.     Appearance: He is well-developed.   HENT:      Head: Normocephalic.   Cardiovascular:      Rate and Rhythm: Normal rate and regular rhythm.      Heart sounds: Normal heart sounds.   Pulmonary:      Effort: Pulmonary effort is normal.      Breath sounds: Normal breath sounds.   Neurological:      Mental Status: He is alert and oriented to person, place, and time.      Gait: Gait normal.   Psychiatric:         Behavior: Behavior normal.         Thought Content: Thought content normal.         Judgment: Judgment normal.        Result Review :   The following data was reviewed by: CARLOS MANUEL Domínguez on 10/29/2021:            Most Recent A1C    HGBA1C Most Recent 10/29/21   Hemoglobin A1C 13.5                         Assessment and Plan    Diagnoses and all orders for this visit:    1. Type 2 diabetes mellitus with hyperglycemia, without long-term " current use of insulin (HCC) (Primary)  -     POC Glycosylated Hemoglobin (Hb A1C)  -     metFORMIN (GLUCOPHAGE) 1000 MG tablet; Take 1 tablet by mouth 2 (Two) Times a Day With Meals.  Dispense: 180 tablet; Refill: 3  -     empagliflozin (Jardiance) 25 MG tablet tablet; Take 1 tablet by mouth Daily.  Dispense: 90 tablet; Refill: 3  -     SITagliptin (Januvia) 100 MG tablet; Take 1 tablet by mouth Daily.  Dispense: 90 tablet; Refill: 3  -     Insulin Glargine, 1 Unit Dial, (Toujeo SoloStar) 300 UNIT/ML solution pen-injector injection; Inject 40 Units under the skin into the appropriate area as directed Every Night.  Dispense: 12 pen; Refill: 3  -     Comprehensive Metabolic Panel    2. Need for vaccination  -     FluLaval/Fluarix/Fluzone >6 Months (4030-7665)    3. Hyperlipidemia, unspecified hyperlipidemia type  -     simvastatin (ZOCOR) 20 MG tablet; Take 1 tablet by mouth Every Night.  Dispense: 90 tablet; Refill: 3  -     Comprehensive Metabolic Panel  -     Lipid Panel With LDL / HDL Ratio    4. Anxiety    5. Screening for iron deficiency anemia  -     CBC & Differential    6. Special screening for malignant neoplasm of prostate  -     PSA Screen    7. Medically noncompliant        Follow Up   Return in about 3 months (around 1/29/2022) for Recheck.  Patient was given instructions and counseling regarding his condition or for health maintenance advice. Please see specific information pulled into the AVS if appropriate.     Start all medications return to the office in 3 months work on diet exercise and weight loss follow-up after lab results    Mask and googles worn

## 2021-11-16 LAB
ALBUMIN SERPL-MCNC: 4.1 G/DL (ref 4–5)
ALBUMIN/GLOB SERPL: 1.6 {RATIO} (ref 1.2–2.2)
ALP SERPL-CCNC: 114 IU/L (ref 44–121)
ALT SERPL-CCNC: 52 IU/L (ref 0–44)
AST SERPL-CCNC: 26 IU/L (ref 0–40)
BASOPHILS # BLD AUTO: 0.1 X10E3/UL (ref 0–0.2)
BASOPHILS NFR BLD AUTO: 1 %
BILIRUB SERPL-MCNC: 0.5 MG/DL (ref 0–1.2)
BUN SERPL-MCNC: 10 MG/DL (ref 6–24)
BUN/CREAT SERPL: 13 (ref 9–20)
CALCIUM SERPL-MCNC: 9.6 MG/DL (ref 8.7–10.2)
CHLORIDE SERPL-SCNC: 99 MMOL/L (ref 96–106)
CHOLEST SERPL-MCNC: 203 MG/DL (ref 100–199)
CO2 SERPL-SCNC: 19 MMOL/L (ref 20–29)
CREAT SERPL-MCNC: 0.79 MG/DL (ref 0.76–1.27)
EOSINOPHIL # BLD AUTO: 0.2 X10E3/UL (ref 0–0.4)
EOSINOPHIL NFR BLD AUTO: 3 %
ERYTHROCYTE [DISTWIDTH] IN BLOOD BY AUTOMATED COUNT: 12 % (ref 11.6–15.4)
GLOBULIN SER CALC-MCNC: 2.6 G/DL (ref 1.5–4.5)
GLUCOSE SERPL-MCNC: 284 MG/DL (ref 65–99)
HCT VFR BLD AUTO: 48.6 % (ref 37.5–51)
HDLC SERPL-MCNC: 33 MG/DL
HGB BLD-MCNC: 16.9 G/DL (ref 13–17.7)
IMM GRANULOCYTES # BLD AUTO: 0 X10E3/UL (ref 0–0.1)
IMM GRANULOCYTES NFR BLD AUTO: 0 %
LDLC SERPL CALC-MCNC: 125 MG/DL (ref 0–99)
LDLC/HDLC SERPL: 3.8 RATIO (ref 0–3.6)
LYMPHOCYTES # BLD AUTO: 1.8 X10E3/UL (ref 0.7–3.1)
LYMPHOCYTES NFR BLD AUTO: 29 %
MCH RBC QN AUTO: 30.7 PG (ref 26.6–33)
MCHC RBC AUTO-ENTMCNC: 34.8 G/DL (ref 31.5–35.7)
MCV RBC AUTO: 88 FL (ref 79–97)
MONOCYTES # BLD AUTO: 0.3 X10E3/UL (ref 0.1–0.9)
MONOCYTES NFR BLD AUTO: 5 %
NEUTROPHILS # BLD AUTO: 3.8 X10E3/UL (ref 1.4–7)
NEUTROPHILS NFR BLD AUTO: 62 %
PLATELET # BLD AUTO: 303 X10E3/UL (ref 150–450)
POTASSIUM SERPL-SCNC: 4.6 MMOL/L (ref 3.5–5.2)
PROT SERPL-MCNC: 6.7 G/DL (ref 6–8.5)
PSA SERPL-MCNC: 0.4 NG/ML (ref 0–4)
RBC # BLD AUTO: 5.51 X10E6/UL (ref 4.14–5.8)
SODIUM SERPL-SCNC: 134 MMOL/L (ref 134–144)
TRIGL SERPL-MCNC: 256 MG/DL (ref 0–149)
VLDLC SERPL CALC-MCNC: 45 MG/DL (ref 5–40)
WBC # BLD AUTO: 6.2 X10E3/UL (ref 3.4–10.8)

## 2021-11-16 RX ORDER — SIMVASTATIN 40 MG
40 TABLET ORAL NIGHTLY
Qty: 90 TABLET | Refills: 3 | Status: SHIPPED | OUTPATIENT
Start: 2021-11-16

## 2022-01-31 ENCOUNTER — OFFICE VISIT (OUTPATIENT)
Dept: FAMILY MEDICINE CLINIC | Facility: CLINIC | Age: 42
End: 2022-01-31

## 2022-01-31 VITALS
OXYGEN SATURATION: 97 % | DIASTOLIC BLOOD PRESSURE: 80 MMHG | SYSTOLIC BLOOD PRESSURE: 140 MMHG | WEIGHT: 295 LBS | TEMPERATURE: 98 F | HEIGHT: 71 IN | BODY MASS INDEX: 41.3 KG/M2 | HEART RATE: 82 BPM

## 2022-01-31 DIAGNOSIS — E78.5 HYPERLIPIDEMIA, UNSPECIFIED HYPERLIPIDEMIA TYPE: Chronic | ICD-10-CM

## 2022-01-31 DIAGNOSIS — F41.9 ANXIETY: ICD-10-CM

## 2022-01-31 DIAGNOSIS — E11.65 TYPE 2 DIABETES MELLITUS WITH HYPERGLYCEMIA, WITHOUT LONG-TERM CURRENT USE OF INSULIN: Primary | ICD-10-CM

## 2022-01-31 LAB
EXPIRATION DATE: ABNORMAL
HBA1C MFR BLD: 10.8 %
Lab: ABNORMAL

## 2022-01-31 PROCEDURE — 36416 COLLJ CAPILLARY BLOOD SPEC: CPT | Performed by: NURSE PRACTITIONER

## 2022-01-31 PROCEDURE — 99214 OFFICE O/P EST MOD 30 MIN: CPT | Performed by: NURSE PRACTITIONER

## 2022-01-31 PROCEDURE — 83036 HEMOGLOBIN GLYCOSYLATED A1C: CPT | Performed by: NURSE PRACTITIONER

## 2022-01-31 RX ORDER — INSULIN GLARGINE 300 U/ML
50 INJECTION, SOLUTION SUBCUTANEOUS NIGHTLY
Qty: 15 PEN | Refills: 3 | Status: SHIPPED | OUTPATIENT
Start: 2022-01-31 | End: 2022-08-10

## 2022-01-31 RX ORDER — CLOBETASOL PROPIONATE 0.5 MG/G
CREAM TOPICAL
COMMUNITY
Start: 2021-11-16

## 2022-01-31 NOTE — PROGRESS NOTES
"Chief Complaint  Diabetes (Patient is needing his a1c checked )    Subjective          Kenny Pavon presents to [facility]  History of Present Illness  Diabetes-patient is currently on Metformin 1000 mg twice a day, Januvia 100 mg a day, jardiance 25mg and Toujeo 40 units a day.  A1c was last checked in October 2021 with result of 13.5.    Hyperlipidemia-cholesterol was last checked in November 2021 with a total cholesterol of 203 and an LDL of 125.  Patient is currently on simvastatin 40 mg daily as directed without any side effects.    Anxiety-patient is on Paxil 30 mg daily as directed and side effects working well to control anxiety.      Objective   Vital Signs:   /80   Pulse 82   Temp 98 °F (36.7 °C)   Ht 180.3 cm (70.98\")   Wt 134 kg (295 lb)   SpO2 97%   BMI 41.16 kg/m²     Physical Exam  Vitals reviewed.   Constitutional:       General: He is not in acute distress.     Appearance: He is well-developed.   HENT:      Head: Normocephalic.   Cardiovascular:      Rate and Rhythm: Normal rate and regular rhythm.      Heart sounds: Normal heart sounds.   Pulmonary:      Effort: Pulmonary effort is normal.      Breath sounds: Normal breath sounds.   Neurological:      Mental Status: He is alert and oriented to person, place, and time.      Gait: Gait normal.   Psychiatric:         Behavior: Behavior normal.         Thought Content: Thought content normal.         Judgment: Judgment normal.        Result Review :   The following data was reviewed by: CARLOS MANUEL Domínguez on 01/31/2022:  CMP    CMP 11/15/21   Glucose 284 (A)   BUN 10   Creatinine 0.79   eGFR Non  Am 112   eGFR African Am 130   Sodium 134   Potassium 4.6   Chloride 99   Calcium 9.6   Total Protein 6.7   Albumin 4.1   Globulin 2.6   Total Bilirubin 0.5   Alkaline Phosphatase 114   AST (SGOT) 26   ALT (SGPT) 52 (A)   (A) Abnormal value       Comments are available for some flowsheets but are not " being displayed.           CBC w/diff    CBC w/Diff 11/15/21   WBC 6.2   RBC 5.51   Hemoglobin 16.9   Hematocrit 48.6   MCV 88   MCH 30.7   MCHC 34.8   RDW 12.0   Platelets 303   Neutrophil Rel % 62   Lymphocyte Rel % 29   Monocyte Rel % 5   Eosinophil Rel % 3   Basophil Rel % 1           Lipid Panel    Lipid Panel 11/15/21   Total Cholesterol 203 (A)   Triglycerides 256 (A)   HDL Cholesterol 33 (A)   VLDL Cholesterol 45 (A)   LDL Cholesterol  125 (A)   LDL/HDL Ratio 3.8 (A)   (A) Abnormal value       Comments are available for some flowsheets but are not being displayed.               Most Recent A1C    HGBA1C Most Recent 1/31/22   Hemoglobin A1C 10.8           PSA    PSA 11/15/21   PSA 0.4      Comments are available for some flowsheets but are not being displayed.                     Assessment and Plan    Diagnoses and all orders for this visit:    1. Type 2 diabetes mellitus with hyperglycemia, without long-term current use of insulin (HCC) (Primary)  -     POC Glycosylated Hemoglobin (Hb A1C)  -     Insulin Glargine, 1 Unit Dial, (Toujeo SoloStar) 300 UNIT/ML solution pen-injector injection; Inject 50 Units under the skin into the appropriate area as directed Every Night.  Dispense: 15 pen; Refill: 3    2. Hyperlipidemia, unspecified hyperlipidemia type    3. Anxiety        Follow Up   Return in about 3 months (around 4/30/2022) for Recheck.  Patient was given instructions and counseling regarding his condition or for health maintenance advice. Please see specific information pulled into the AVS if appropriate.     Increase toujeo to 50 units a day  cont same with other meds  rto in 3 mons     Mask and googles worn

## 2022-05-03 ENCOUNTER — OFFICE VISIT (OUTPATIENT)
Dept: FAMILY MEDICINE CLINIC | Facility: CLINIC | Age: 42
End: 2022-05-03

## 2022-05-03 VITALS
DIASTOLIC BLOOD PRESSURE: 80 MMHG | TEMPERATURE: 97.3 F | BODY MASS INDEX: 40.6 KG/M2 | SYSTOLIC BLOOD PRESSURE: 122 MMHG | HEART RATE: 83 BPM | WEIGHT: 290 LBS | OXYGEN SATURATION: 95 % | HEIGHT: 71 IN

## 2022-05-03 DIAGNOSIS — E11.65 TYPE 2 DIABETES MELLITUS WITH HYPERGLYCEMIA, WITHOUT LONG-TERM CURRENT USE OF INSULIN: Primary | ICD-10-CM

## 2022-05-03 DIAGNOSIS — E78.5 HYPERLIPIDEMIA, UNSPECIFIED HYPERLIPIDEMIA TYPE: Chronic | ICD-10-CM

## 2022-05-03 DIAGNOSIS — Z91.199 NONCOMPLIANCE: ICD-10-CM

## 2022-05-03 LAB
EXPIRATION DATE: ABNORMAL
HBA1C MFR BLD: 11 %
Lab: ABNORMAL

## 2022-05-03 PROCEDURE — 83036 HEMOGLOBIN GLYCOSYLATED A1C: CPT | Performed by: NURSE PRACTITIONER

## 2022-05-03 PROCEDURE — 36416 COLLJ CAPILLARY BLOOD SPEC: CPT | Performed by: NURSE PRACTITIONER

## 2022-05-03 PROCEDURE — 99214 OFFICE O/P EST MOD 30 MIN: CPT | Performed by: NURSE PRACTITIONER

## 2022-05-03 RX ORDER — HALOBETASOL PROPIONATE AND TAZAROTENE .1; .45 MG/G; MG/G
0.01 LOTION TOPICAL
COMMUNITY
Start: 2022-03-24

## 2022-05-03 NOTE — PROGRESS NOTES
"Chief Complaint  Diabetes (A1C check )    Subjective          Kenny Pavon presents to [facility]  History of Present Illness  Diabetes-patient is currently on metformin 1000 mg twice a day, Januvia 100 mg a day and insulin glargine 50 units at night.  A1c was last checked January 2022 with a result of 10.8. Pt admits that he is not taking the insulin at least 4 times a week.    Hyperlipidemia-cholesterol was last checked in November 2021 the total cholesterol of 203 and an LDL of 125.  He is currently on simvastatin 40 mg daily as directed without any side effects.    Objective   Vital Signs:   /80   Pulse 83   Temp 97.3 °F (36.3 °C)   Ht 180.3 cm (71\")   Wt 132 kg (290 lb)   SpO2 95%   BMI 40.45 kg/m²     Physical Exam  Vitals reviewed.   Constitutional:       General: He is not in acute distress.     Appearance: He is well-developed.   HENT:      Head: Normocephalic.   Cardiovascular:      Rate and Rhythm: Normal rate and regular rhythm.      Heart sounds: Normal heart sounds.   Pulmonary:      Effort: Pulmonary effort is normal.      Breath sounds: Normal breath sounds.   Neurological:      Mental Status: He is alert and oriented to person, place, and time.      Gait: Gait normal.   Psychiatric:         Behavior: Behavior normal.         Thought Content: Thought content normal.         Judgment: Judgment normal.        Result Review :   The following data was reviewed by: CARLOS MANUEL Domínguez on 05/03/2022:  CMP    CMP 11/15/21   Glucose 284 (A)   BUN 10   Creatinine 0.79   eGFR Non  Am 112   eGFR African Am 130   Sodium 134   Potassium 4.6   Chloride 99   Calcium 9.6   Total Protein 6.7   Albumin 4.1   Globulin 2.6   Total Bilirubin 0.5   Alkaline Phosphatase 114   AST (SGOT) 26   ALT (SGPT) 52 (A)   (A) Abnormal value       Comments are available for some flowsheets but are not being displayed.           CBC w/diff    CBC w/Diff 11/15/21   WBC 6.2   RBC " Elisa-please check old fax and refax otherwise we will need to request another form   5.51   Hemoglobin 16.9   Hematocrit 48.6   MCV 88   MCH 30.7   MCHC 34.8   RDW 12.0   Platelets 303   Neutrophil Rel % 62   Lymphocyte Rel % 29   Monocyte Rel % 5   Eosinophil Rel % 3   Basophil Rel % 1           Lipid Panel    Lipid Panel 11/15/21   Total Cholesterol 203 (A)   Triglycerides 256 (A)   HDL Cholesterol 33 (A)   VLDL Cholesterol 45 (A)   LDL Cholesterol  125 (A)   LDL/HDL Ratio 3.8 (A)   (A) Abnormal value       Comments are available for some flowsheets but are not being displayed.               Most Recent A1C    HGBA1C Most Recent 5/3/22   Hemoglobin A1C 11.0           PSA    PSA 11/15/21   PSA 0.4      Comments are available for some flowsheets but are not being displayed.                     Assessment and Plan    Diagnoses and all orders for this visit:    1. Type 2 diabetes mellitus with hyperglycemia, without long-term current use of insulin (HCC) (Primary)  -     POC Glycosylated Hemoglobin (Hb A1C)  -     Ambulatory Referral to Endocrinology  -     Comprehensive Metabolic Panel    2. Hyperlipidemia, unspecified hyperlipidemia type  -     Lipid Panel With LDL / HDL Ratio  -     Comprehensive Metabolic Panel    3. Noncompliance               Follow Up   Return in about 3 months (around 8/3/2022) for Recheck.  Patient was given instructions and counseling regarding his condition or for health maintenance advice. Please see specific information pulled into the AVS if appropriate.     Referral to endocrinology for diabetes management  Discussed need of taking insulin daily  Better diet control and exercise, weight loss  Cont with meds  Follow up after labs     Mask and googles worn

## 2022-05-04 LAB
ALBUMIN SERPL-MCNC: 4.1 G/DL (ref 4–5)
ALBUMIN/GLOB SERPL: 1.5 {RATIO} (ref 1.2–2.2)
ALP SERPL-CCNC: 86 IU/L (ref 44–121)
ALT SERPL-CCNC: 37 IU/L (ref 0–44)
AST SERPL-CCNC: 20 IU/L (ref 0–40)
BILIRUB SERPL-MCNC: 0.4 MG/DL (ref 0–1.2)
BUN SERPL-MCNC: 10 MG/DL (ref 6–24)
BUN/CREAT SERPL: 14 (ref 9–20)
CALCIUM SERPL-MCNC: 9.7 MG/DL (ref 8.7–10.2)
CHLORIDE SERPL-SCNC: 100 MMOL/L (ref 96–106)
CHOLEST SERPL-MCNC: 182 MG/DL (ref 100–199)
CO2 SERPL-SCNC: 19 MMOL/L (ref 20–29)
CREAT SERPL-MCNC: 0.69 MG/DL (ref 0.76–1.27)
EGFRCR SERPLBLD CKD-EPI 2021: 119 ML/MIN/1.73
GLOBULIN SER CALC-MCNC: 2.7 G/DL (ref 1.5–4.5)
GLUCOSE SERPL-MCNC: 208 MG/DL (ref 65–99)
HDLC SERPL-MCNC: 36 MG/DL
LDLC SERPL CALC-MCNC: 114 MG/DL (ref 0–99)
LDLC/HDLC SERPL: 3.2 RATIO (ref 0–3.6)
POTASSIUM SERPL-SCNC: 4.4 MMOL/L (ref 3.5–5.2)
PROT SERPL-MCNC: 6.8 G/DL (ref 6–8.5)
SODIUM SERPL-SCNC: 136 MMOL/L (ref 134–144)
TRIGL SERPL-MCNC: 180 MG/DL (ref 0–149)
VLDLC SERPL CALC-MCNC: 32 MG/DL (ref 5–40)

## 2022-07-15 ENCOUNTER — OFFICE VISIT (OUTPATIENT)
Dept: ENDOCRINOLOGY | Age: 42
End: 2022-07-15

## 2022-07-15 VITALS
DIASTOLIC BLOOD PRESSURE: 80 MMHG | SYSTOLIC BLOOD PRESSURE: 118 MMHG | HEART RATE: 87 BPM | BODY MASS INDEX: 40.23 KG/M2 | TEMPERATURE: 98.2 F | WEIGHT: 287.4 LBS | HEIGHT: 71 IN | OXYGEN SATURATION: 95 %

## 2022-07-15 DIAGNOSIS — Z79.4 TYPE 2 DIABETES MELLITUS WITH HYPERGLYCEMIA, WITH LONG-TERM CURRENT USE OF INSULIN: ICD-10-CM

## 2022-07-15 DIAGNOSIS — E11.65 TYPE 2 DIABETES MELLITUS WITH HYPERGLYCEMIA, WITH LONG-TERM CURRENT USE OF INSULIN: ICD-10-CM

## 2022-07-15 DIAGNOSIS — E11.69 HYPERLIPIDEMIA ASSOCIATED WITH TYPE 2 DIABETES MELLITUS: ICD-10-CM

## 2022-07-15 DIAGNOSIS — E78.5 HYPERLIPIDEMIA ASSOCIATED WITH TYPE 2 DIABETES MELLITUS: ICD-10-CM

## 2022-07-15 DIAGNOSIS — Z79.4 LONG-TERM INSULIN USE: Primary | ICD-10-CM

## 2022-07-15 PROCEDURE — 99204 OFFICE O/P NEW MOD 45 MIN: CPT | Performed by: INTERNAL MEDICINE

## 2022-07-15 RX ORDER — DULAGLUTIDE 1.5 MG/.5ML
1.5 INJECTION, SOLUTION SUBCUTANEOUS WEEKLY
Qty: 2 ML | Refills: 11 | Status: SHIPPED | OUTPATIENT
Start: 2022-07-15 | End: 2023-07-15

## 2022-07-15 RX ORDER — BLOOD-GLUCOSE METER
EACH MISCELLANEOUS
Qty: 1 KIT | Refills: 0 | Status: SHIPPED | OUTPATIENT
Start: 2022-07-15

## 2022-07-15 RX ORDER — LANCETS
EACH MISCELLANEOUS
Qty: 100 EACH | Refills: 2 | Status: SHIPPED | OUTPATIENT
Start: 2022-07-15

## 2022-07-15 NOTE — PROGRESS NOTES
"Chief Complaint  Type 2 dm    Subjective          History of Present Illness    Kenny Pavon 42 y.o. presents with Type 2 dm as a new patient. Consulted by Miss. Denson    Type 2 dm - Diagnosed about 2 years ago.   Today in clinic pt reports being on Toujeo 50 units once a day - am,  Metformin 1000 mg po bid, Jardiance 25 mg po daily.   Checks BG - doesn't check as he doesn't have a meter  Sensor - x  Dm retinopathy -x,Last eye exam - due for exam  Dm nephropathy - x  Dm neuropathy -x ,Dm neuropathy meds - n/a  CAD -x  CVA -x  Episodes of hypoglycemia - x  Pt is physically active. weight has been stable.   Doesn't follow DM diet.       Reviewed primary care physician's/consulting physician documentation and lab results         I have reviewed the patient's allergies, medicines, past medical hx, family hx and social hx in detail.    Objective   Vital Signs:   /80   Pulse 87   Temp 98.2 °F (36.8 °C) (Temporal)   Ht 180.3 cm (71\")   Wt 130 kg (287 lb 6.4 oz)   SpO2 95%   BMI 40.08 kg/m²   Physical Exam   General appearance - no distress  Eyes- anicteric sclera  Ear nose and throat-external ears and nose normal.    Respiratory-normal chest on inspection.  No respiratory distress noted.  Skin-no rashes.  Neuro-alert and oriented x3            Result Review :   The following data was reviewed by: Mari Doyle MD on 07/15/2022:  Office Visit on 05/03/2022   Component Date Value Ref Range Status   • Hemoglobin A1C 05/03/2022 11.0  % Final   • Lot Number 05/03/2022 [id number]   Final   • Expiration Date 05/03/2022 1/3/24   Final   • Total Cholesterol 05/03/2022 182  100 - 199 mg/dL Final   • Triglycerides 05/03/2022 180 (A) 0 - 149 mg/dL Final   • HDL Cholesterol 05/03/2022 36 (A) >39 mg/dL Final   • VLDL Cholesterol Bonifacio 05/03/2022 32  5 - 40 mg/dL Final   • LDL Chol Calc ([facility]) 05/03/2022 114 (A) 0 - 99 mg/dL Final   • LDL/HDL RATIO 05/03/2022 3.2  0.0 - 3.6 ratio Final    Comment:                         "             LDL/HDL Ratio                                              Men  Women                                1/2 Avg.Risk  1.0    1.5                                    Avg.Risk  3.6    3.2                                 2X Avg.Risk  6.2    5.0                                 3X Avg.Risk  8.0    6.1     • Glucose 05/03/2022 208 (A) 65 - 99 mg/dL Final   • BUN 05/03/2022 10  6 - 24 mg/dL Final   • Creatinine 05/03/2022 0.69 (A) 0.76 - 1.27 mg/dL Final   • EGFR Result 05/03/2022 119  >59 mL/min/1.73 Final   • BUN/Creatinine Ratio 05/03/2022 14  9 - 20 Final   • Sodium 05/03/2022 136  134 - 144 mmol/L Final   • Potassium 05/03/2022 4.4  3.5 - 5.2 mmol/L Final   • Chloride 05/03/2022 100  96 - 106 mmol/L Final   • Total CO2 05/03/2022 19 (A) 20 - 29 mmol/L Final   • Calcium 05/03/2022 9.7  8.7 - 10.2 mg/dL Final   • Total Protein 05/03/2022 6.8  6.0 - 8.5 g/dL Final   • Albumin 05/03/2022 4.1  4.0 - 5.0 g/dL Final   • Globulin 05/03/2022 2.7  1.5 - 4.5 g/dL Final   • A/G Ratio 05/03/2022 1.5  1.2 - 2.2 Final   • Total Bilirubin 05/03/2022 0.4  0.0 - 1.2 mg/dL Final   • Alkaline Phosphatase 05/03/2022 86  44 - 121 IU/L Final   • AST (SGOT) 05/03/2022 20  0 - 40 IU/L Final   • ALT (SGPT) 05/03/2022 37  0 - 44 IU/L Final     Data reviewed: PCP notes       Results Review:    I reviewed the patient's new clinical results.     Assessment and Plan      Problem List Items Addressed This Visit    None     Visit Diagnoses     Long-term insulin use (HCC)    -  Primary    Relevant Orders    Ambulatory Referral to Diabetic Education    Basic Metabolic Panel    Hemoglobin A1c    Lipid Panel    TSH    Vitamin B12 & Folate    Vitamin D 25 Hydroxy    T4, Free    Type 2 diabetes mellitus with hyperglycemia, with long-term current use of insulin (HCC)        Relevant Medications    Dulaglutide (Trulicity) 1.5 MG/0.5ML solution pen-injector    Other Relevant Orders    Ambulatory Referral to Diabetic Education    Basic Metabolic Panel  "   Hemoglobin A1c    Lipid Panel    TSH    Vitamin B12 & Folate    Vitamin D 25 Hydroxy    T4, Free    Hyperlipidemia associated with type 2 diabetes mellitus (HCC)        Relevant Medications    Dulaglutide (Trulicity) 1.5 MG/0.5ML solution pen-injector    Other Relevant Orders    Basic Metabolic Panel    Hemoglobin A1c    Lipid Panel    TSH    Vitamin B12 & Folate    Vitamin D 25 Hydroxy    T4, Free        Type 2 diabetes mellitus-severely uncontrolled  HbA1c greater than 11%  Continue metformin  Continue Jardiance  Continue Toujeo 50 units daily in the morning    Start Trulicity 1.5 mg subcutaneous once a week.  If we have any samples will help the patient with a sample if not emphasized the patient to let us know next week if Trulicity is not covered by his insurance.    Place CGM on the patient to further assess the glycemic trends on the medication.    Hyperlipidemia  Continue simvastatin.    Advised the patient to start checking his blood sugars at least 1-2 times a day.    Refer the patient to the diabetic educator to help with the diet control.    Interpreted the blood work-up/imaging results performed by the primary care/consulting physician -    Refills sent to pharmacy    Follow Up     Patient was given instructions and counseling regarding her condition or for health maintenance advice. Please see specific information pulled into the AVS if appropriate.       Thank you for asking me to see your patient, Kenny Pavon in consultation.         Mari Doyle MD  07/15/22      [facility] Dragon / transcription disclaimer:     \"Dictated utilizing Dragon dictation\".         "

## 2022-07-15 NOTE — PATIENT INSTRUCTIONS
Dulaglutide Injection  What is this medicine?  DULAGLUTIDE (DOO la GLOO tide) controls blood sugar in people with type 2 diabetes. It is used with lifestyle changes like diet and exercise. It may lower the risk of problems that need treatment in the hospital. These problems include heart attack or stroke.  This medicine may be used for other purposes; ask your health care provider or pharmacist if you have questions.  COMMON BRAND NAME(S): Trulicity  What should I tell my health care provider before I take this medicine?  They need to know if you have any of these conditions:  endocrine tumors (MEN 2) or if someone in your family had these tumors  eye disease, vision problems  history of pancreatitis  kidney disease  liver disease  stomach or intestine problems  thyroid cancer or if someone in your family had thyroid cancer  an unusual or allergic reaction to dulaglutide, other medicines, foods, dyes, or preservatives  pregnant or trying to get pregnant  breast-feeding  How should I use this medicine?  This medicine is injected under the skin. You will be taught how to prepare and give it. Take it as directed on the prescription label on the same day of each week. Do NOT prime the pen. Keep taking it unless your health care provider tells you to stop.  If you use this medicine with insulin, you should inject this medicine and the insulin separately. Do not mix them together. Do not give the injections right next to each other. Change (rotate) injection sites with each injection.  This drug comes with INSTRUCTIONS FOR USE. Ask your pharmacist for directions on how to use this medicine. Read the information carefully. Talk to your pharmacist or health care provider if you have questions.  It is important that you put your used needles and syringes in a special sharps container. Do not put them in a trash can. If you do not have a sharps container, call your pharmacist or health care provider to get one.  A special  MedGuide will be given to you by the pharmacist with each prescription and refill. Be sure to read this information carefully each time.  Talk to your health care provider about the use of this medicine in children. Special care may be needed.  Overdosage: If you think you have taken too much of this medicine contact a poison control center or emergency room at once.  NOTE: This medicine is only for you. Do not share this medicine with others.  What if I miss a dose?  If you miss a dose, take it as soon as you can unless it is more than 3 days late. If it is more than 3 days late, skip the missed dose. Take the next dose at the normal time.  What may interact with this medicine?  other medicines for diabetes  Many medications may cause changes in blood sugar, these include:  alcohol containing beverages  antiviral medicines for HIV or AIDS  aspirin and aspirin-like drugs  certain medicines for blood pressure, heart disease, irregular heart beat  chromium  diuretics  female hormones, such as estrogens or progestins, birth control pills  fenofibrate  gemfibrozil  isoniazid  lanreotide  male hormones or anabolic steroids  MAOIs like Carbex, Eldepryl, Marplan, Nardil, and Parnate  medicines for allergies, asthma, cold, or cough  medicines for depression, anxiety, or psychotic disturbances  medicines for weight loss  niacin  nicotine  NSAIDs, medicines for pain and inflammation, like ibuprofen or naproxen  octreotide  pasireotide  pentamidine  phenytoin  probenecid  quinolone antibiotics such as ciprofloxacin, levofloxacin, ofloxacin  some herbal dietary supplements  steroid medicines such as prednisone or cortisone  sulfamethoxazole; trimethoprim  thyroid hormones  Some medications can hide the warning symptoms of low blood sugar (hypoglycemia). You may need to monitor your blood sugar more closely if you are taking one of these medications. These include:  beta-blockers, often used for high blood pressure or heart  problems (examples include atenolol, metoprolol, propranolol)  clonidine  guanethidine  reserpine  This list may not describe all possible interactions. Give your health care provider a list of all the medicines, herbs, non-prescription drugs, or dietary supplements you use. Also tell them if you smoke, drink alcohol, or use illegal drugs. Some items may interact with your medicine.  What should I watch for while using this medicine?  Visit your health care provider for regular checks on your progress.  Check with your health care provider if you have severe diarrhea, nausea, and vomiting, or if you sweat a lot. The loss of too much body fluid may make it dangerous for you to take this medicine.  A test called the HbA1C (A1C) will be monitored. This is a simple blood test. It measures your blood sugar control over the last 2 to 3 months. You will receive this test every 3 to 6 months.  Learn how to check your blood sugar. Learn the symptoms of low and high blood sugar and how to manage them.  Always carry a quick-source of sugar with you in case you have symptoms of low blood sugar. Examples include hard sugar candy or glucose tablets. Make sure others know that you can choke if you eat or drink when you develop serious symptoms of low blood sugar, such as seizures or unconsciousness. Get medical help at once.  Tell your health care provider if you have high blood sugar. You might need to change the dose of your medicine. If you are sick or exercising more than usual, you may need to change the dose of your medicine.  Do not skip meals. Ask your health care provider if you should avoid alcohol. Many nonprescription cough and cold products contain sugar or alcohol. These can affect blood sugar.  Pens should never be shared. Even if the needle is changed, sharing may result in passing of viruses like hepatitis or HIV.  Wear a medical ID bracelet or chain. Carry a card that describes your condition. List the medicines  and doses you take on the card.  What side effects may I notice from receiving this medicine?  Side effects that you should report to your doctor or health care professional as soon as possible:  allergic reactions (skin rash, itching or hives; swelling of the face, lips, or tongue)  changes in vision  diarrhea that continues or is severe  infection (fever, chills, cough, sore throat, pain or trouble passing urine)  kidney injury (trouble passing urine or change in the amount of urine)  low blood sugar (feeling anxious; confusion; dizziness; increased hunger; unusually weak or tired; increased sweating; shakiness; cold, clammy skin; irritable; headache; blurred vision; fast heartbeat; loss of consciousness)  lump or swelling on the neck  trouble breathing  trouble swallowing  unusual stomach upset or pain  vomiting  Side effects that usually do not require medical attention (report to your doctor or health care professional if they continue or are bothersome):  lack or loss of appetite  nausea  pain, redness, or irritation at site where injected  This list may not describe all possible side effects. Call your doctor for medical advice about side effects. You may report side effects to FDA at 8-899-FDA-5011.  Where should I keep my medicine?  Keep out of the reach of children and pets.  Refrigeration (preferred): Store unopened pens in a refrigerator between 2 and 8 degrees C (36 and 46 degrees F). Keep it in the original carton until you are ready to take it. Do not freeze or use if the medicine has been frozen. Protect from light. Get rid of any unused medicine after the expiration date on the label.  Room Temperature: The pen may be stored at room temperature below 30 degrees C (86 degrees F) for up to a total of 14 days if needed. Protect from light. Avoid exposure to extreme heat. If it is stored at room temperature, throw away any unused medicine after 14 days or after it expires, whichever is first.  To get  rid of medicines that are no longer needed or have :  Take the medicine to a medicine take-back program. Check with your pharmacy or law enforcement to find a location.  If you cannot return the medicine, ask your pharmacist or health care provider how to get rid of this medicine safely.  NOTE: This sheet is a summary. It may not cover all possible information. If you have questions about this medicine, talk to your doctor, pharmacist, or health care provider.  ©  [employer]vier/Gold Standard (2021-10-18 07:35:51)

## 2022-08-03 ENCOUNTER — TREATMENT (OUTPATIENT)
Dept: ENDOCRINOLOGY | Age: 42
End: 2022-08-03

## 2022-08-03 DIAGNOSIS — E11.65 TYPE 2 DIABETES MELLITUS WITH HYPERGLYCEMIA, WITHOUT LONG-TERM CURRENT USE OF INSULIN: ICD-10-CM

## 2022-08-03 PROCEDURE — 95250 CONT GLUC MNTR PHYS/QHP EQP: CPT | Performed by: INTERNAL MEDICINE

## 2022-08-03 PROCEDURE — 95251 CONT GLUC MNTR ANALYSIS I&R: CPT | Performed by: INTERNAL MEDICINE

## 2022-08-03 NOTE — PROGRESS NOTES
A continuous glucose monitor Freestyle dana pro  was placed 7/15/22 The device was activated, and the patient was educated on proper use of the device. The patient will return in 14  days for removal of the device and interpretation of the results.    Continues glucose monitoring data        Patient wore continuous glucose monitoring-free style dana Pro from   Average blood glucose reading was July 15-29th  Estimated HbA1c 10.5%  Likelihood of low blood glucose levels was low  Likelihood of high blood glucose levels was high  Blood glucose trends showed significantly elevated blood sugars    Current treatment regimen  On metformin, Jardiance  Toujeo  Trulicity    Changes to the treatment regimen  Continue metformin, Jardiance  Please make sure that the patient is taking Trulicity  Increase Toujeo to 60 units daily in the morning increase Toujeo to 60 units daily in the morning  Add glimepiride 4 mg twice daily before breakfast and before dinner.

## 2022-08-10 RX ORDER — INSULIN GLARGINE 300 U/ML
60 INJECTION, SOLUTION SUBCUTANEOUS NIGHTLY
Qty: 15 PEN | Refills: 3 | Status: SHIPPED | OUTPATIENT
Start: 2022-08-10

## 2022-08-10 RX ORDER — GLIMEPIRIDE 4 MG/1
4 TABLET ORAL
Qty: 60 TABLET | Refills: 11 | Status: SHIPPED | OUTPATIENT
Start: 2022-08-10 | End: 2022-08-18 | Stop reason: SDUPTHER

## 2022-08-18 RX ORDER — GLIMEPIRIDE 4 MG/1
4 TABLET ORAL
Qty: 90 TABLET | Refills: 1 | Status: SHIPPED | OUTPATIENT
Start: 2022-08-18

## 2022-08-18 NOTE — PROGRESS NOTES
Called and spoke with patient about the freestyle dana pro cgm results and medication change  Patient voice understanding

## 2022-09-01 ENCOUNTER — OFFICE VISIT (OUTPATIENT)
Dept: FAMILY MEDICINE CLINIC | Facility: CLINIC | Age: 42
End: 2022-09-01

## 2022-09-01 VITALS
BODY MASS INDEX: 42.42 KG/M2 | HEIGHT: 71 IN | OXYGEN SATURATION: 98 % | DIASTOLIC BLOOD PRESSURE: 70 MMHG | WEIGHT: 303 LBS | HEART RATE: 84 BPM | TEMPERATURE: 97 F | SYSTOLIC BLOOD PRESSURE: 126 MMHG

## 2022-09-01 DIAGNOSIS — E78.2 MIXED HYPERLIPIDEMIA: Chronic | ICD-10-CM

## 2022-09-01 DIAGNOSIS — R35.89 POLYURIA: ICD-10-CM

## 2022-09-01 DIAGNOSIS — E11.65 TYPE 2 DIABETES MELLITUS WITH HYPERGLYCEMIA, WITHOUT LONG-TERM CURRENT USE OF INSULIN: Primary | ICD-10-CM

## 2022-09-01 LAB
BILIRUB BLD-MCNC: NEGATIVE MG/DL
CLARITY, POC: CLEAR
COLOR UR: YELLOW
EXPIRATION DATE: ABNORMAL
GLUCOSE UR STRIP-MCNC: ABNORMAL MG/DL
KETONES UR QL: NEGATIVE
LEUKOCYTE EST, POC: NEGATIVE
Lab: ABNORMAL
NITRITE UR-MCNC: NEGATIVE MG/ML
PH UR: 5.5 [PH] (ref 5–8)
PROT UR STRIP-MCNC: NEGATIVE MG/DL
RBC # UR STRIP: NEGATIVE /UL
SP GR UR: 1.01 (ref 1–1.03)
UROBILINOGEN UR QL: NORMAL

## 2022-09-01 PROCEDURE — 81003 URINALYSIS AUTO W/O SCOPE: CPT | Performed by: NURSE PRACTITIONER

## 2022-09-01 PROCEDURE — 99213 OFFICE O/P EST LOW 20 MIN: CPT | Performed by: NURSE PRACTITIONER

## 2022-09-01 NOTE — ASSESSMENT & PLAN NOTE
Patient advised to continue to follow with endocrinology for treatment.  Encouraged patient to check his glucose levels at least twice a day, fasting in the morning and prior to bed.  Patient does have some polyuria.  Urinalysis was performed today that did show presence of glucose.  Urine was negative for leukocytes or nitrates.  Also encouraged patient to have yearly diabetic eye exam.  We also discussed recommended pneumonia vaccine.

## 2022-09-01 NOTE — ASSESSMENT & PLAN NOTE
Lipids are improving with treatment of simvastatin.  Patient will have lab work performed prior to seeing the endocrinologist in November.

## 2022-09-01 NOTE — PROGRESS NOTES
"Chief Complaint  Establish Care (New pt - Endo) and Hyperlipidemia (Nonfasting )    Masks/face shield/appropriate PPE were worn for the entirety of the visit by the patient, MA, and provider.     Subjective        Kenny Pavon presents to [facility]  History of Present Illness   Kenny Pavon is a 42 y.o. male who presents to clinic today to establish care. Patient was previously following with a [facility] provider.  Patient has no concerns today.    Medical history:  · Type 2 diabetes: Patient is currently following with endocrinology.  Patient underwent continuous glucose monitoring with freestyle dana for 2 weeks.  Estimated hemoglobin A1c based on this result was 10.5%.  Patient is currently taking metformin, Jardiance , Toujeo, glimepiride, and Trulicity.  Patient denies neuropathy.  He has not had a yearly eye exam.  He is not consistently checking his blood sugars.  Patient has not seen a nutritionist.   · Hyperlipidemia: Patient is currently taking simvastatin 40 mg daily.  Last lipid panel was performed in May.  Total cholesterol was within normal limits.  LDL elevated at 114.  · Anxiety: Previously on medication for anxiety, but not currently.  Anxiety well controlled.  · Psoriasis: Patient uses clobetasol follows with dermatology.      Review of Systems   Constitutional: Negative.    HENT: Negative.    Eyes: Negative.    Respiratory: Negative.    Cardiovascular: Negative.    Gastrointestinal: Negative.    Endocrine: Positive for polyuria.   Genitourinary: Negative.    Musculoskeletal: Negative.    Skin: Negative.    Psychiatric/Behavioral: Negative.       Objective   Vital Signs:  /70   Pulse 84   Temp 97 °F (36.1 °C)   Ht 180.3 cm (71\")   Wt (!) 137 kg (303 lb)   SpO2 98%   BMI 42.26 kg/m²   Estimated body mass index is 42.26 kg/m² as calculated from the following:    Height as of this encounter: 180.3 cm (71\").    Weight as of " this encounter: 137 kg (303 lb).          Physical Exam  Vitals reviewed.   Constitutional:       General: He is not in acute distress.     Appearance: Normal appearance. He is not ill-appearing, toxic-appearing or diaphoretic.   HENT:      Head: Normocephalic and atraumatic.   Cardiovascular:      Rate and Rhythm: Normal rate and regular rhythm.      Heart sounds: Normal heart sounds.   Pulmonary:      Effort: No respiratory distress.      Breath sounds: Normal breath sounds. No wheezing.   Abdominal:      Palpations: Abdomen is soft.      Tenderness: There is no abdominal tenderness.   Musculoskeletal:      Right lower leg: No edema.      Left lower leg: No edema.   Neurological:      General: No focal deficit present.      Mental Status: He is alert and oriented to person, place, and time.      Gait: Gait normal.   Psychiatric:         Mood and Affect: Mood normal.         Behavior: Behavior normal.        Diabetic foot exam: No sores or wounds noted to feet bilaterally.  Left: Filament test present   Pulses Dorsalis Pedis:  present  Posterior Tibial:  present    Right: Filament test present   Pulses Dorsalis Pedis:  present  Posterior Tibial:  present       Result Review :    Common labs    Common Labsle 11/15/21 11/15/21 11/15/21 11/15/21 1/31/22 5/3/22 5/3/22 5/3/22    0900 0900 0900 0900  [id number]   Glucose  284 (A)      208 (A)   BUN  10      10   Creatinine  0.79      0.69 (A)   eGFR Non  Am  112         eGFR African Am  130         Sodium  134      136   Potassium  4.6      4.4   Chloride  99      100   Calcium  9.6      9.7   Total Protein  6.7      6.8   Albumin  4.1      4.1   Total Bilirubin  0.5      0.4   Alkaline Phosphatase  114      86   AST (SGOT)  26      20   ALT (SGPT)  52 (A)      37   WBC 6.2          Hemoglobin 16.9          Hematocrit 48.6          Platelets 303          Total Cholesterol   203 (A)    182    Triglycerides   256 (A)    180 (A)    HDL Cholesterol   33 (A)    36  (A)    LDL Cholesterol    125 (A)    114 (A)    Hemoglobin A1C     10.8 11.0     PSA    0.4       (A) Abnormal value       Comments are available for some flowsheets but are not being displayed.           A1C Last 3 Results    HGBA1C Last 3 Results 10/29/21 1/31/22 5/3/22   Hemoglobin A1C 13.5 10.8 11.0           Brief Urine Lab Results  (Last result in the past 365 days)      Color   Clarity   Blood   Leuk Est   Nitrite   Protein   CREAT   Urine HCG        09/01/22 1525 Yellow   Clear   Negative   Negative   Negative   Negative                          Assessment and Plan   Diagnoses and all orders for this visit:    1. Type 2 diabetes mellitus with hyperglycemia, without long-term current use of insulin ([facility]) (Primary)  Assessment & Plan:  Patient advised to continue to follow with endocrinology for treatment.  Encouraged patient to check his glucose levels at least twice a day, fasting in the morning and prior to bed.  Patient does have some polyuria.  Urinalysis was performed today that did show presence of glucose.  Urine was negative for leukocytes or nitrates.  Also encouraged patient to have yearly diabetic eye exam.  We also discussed recommended pneumonia vaccine.    Orders:  -     Cancel: POC Glycosylated Hemoglobin (Hb A1C)  -     POCT urinalysis dipstick, automated  -     Ambulatory Referral to Diabetic Education    2. Mixed hyperlipidemia  Assessment & Plan:  Lipids are improving with treatment of simvastatin.  Patient will have lab work performed prior to seeing the endocrinologist in November.    Orders:  -     Ambulatory Referral to Diabetic Education    3. Polyuria  -     POCT urinalysis dipstick, automated    Patient's vital signs are within normal limits.  Polyuria likely related to uncontrolled diabetes.  Continue to follow with endocrinology as discussed above.  Referral placed for diabetic education.         Follow Up   Return in about 3 months (around 12/1/2022) for Annual physical.  Patient  was given instructions and counseling regarding his condition or for health maintenance advice. Please see specific information pulled into the AVS if appropriate.     Electronically signed by CARLOS MANUEL Noguera, 09/01/22, 4:36 PM EDT.

## 2022-09-28 ENCOUNTER — HOSPITAL ENCOUNTER (OUTPATIENT)
Dept: DIABETES SERVICES | Facility: HOSPITAL | Age: 42
Discharge: HOME | End: 2022-09-28
Admitting: NURSE PRACTITIONER

## 2022-09-28 PROCEDURE — G0108 DIAB MANAGE TRN  PER INDIV: HCPCS

## 2022-09-28 NOTE — NURSING NOTE
Pleasant 43 y/o here in [place] for DM ed. Pt reports + family hx of T2DM (grandfather and father.) Per ADA's standards of care, a full assessment-ed note will be sent to chart thru med records. Pt reports he is expecting to receive BG sensor thru mail order. Plan is for pt to return for RD consultation next week and help w/sensor placement (if needed.) Thank you for this referral. Please don't hesitate to contact us with questions.

## 2022-10-05 ENCOUNTER — HOSPITAL ENCOUNTER (EMERGENCY)
Facility: HOSPITAL | Age: 42
Discharge: LEFT BEFORE DISCHARGE | End: 2022-10-05

## 2022-10-05 ENCOUNTER — HOSPITAL ENCOUNTER (OUTPATIENT)
Dept: DIABETES SERVICES | Facility: HOSPITAL | Age: 42
Discharge: HOME | End: 2022-10-05
Admitting: NURSE PRACTITIONER

## 2022-10-05 VITALS — HEART RATE: 96 BPM | TEMPERATURE: 97.2 F | RESPIRATION RATE: 18 BRPM | OXYGEN SATURATION: 96 %

## 2022-10-05 LAB — GLUCOSE BLDC GLUCOMTR-MCNC: 377 MG/DL (ref 70–130)

## 2022-10-05 PROCEDURE — 99211 OFF/OP EST MAY X REQ PHY/QHP: CPT

## 2022-10-05 PROCEDURE — 97802 MEDICAL NUTRITION INDIV IN: CPT

## 2022-10-05 PROCEDURE — 82962 GLUCOSE BLOOD TEST: CPT

## 2022-10-05 NOTE — ED TRIAGE NOTES
Patient to ed from outpatient services with complaints of a BG of 420. Patient was there for diabetic counseling.

## 2022-10-05 NOTE — PROGRESS NOTES
Mr. Pavon met with DARLIN CALL for MNT for Type 2 diabetes. Consistent with the ADA’s standards of care, a comprehensive assessment/training record has been sent to medical records (see media tab).      Patient brought his home glucometer to today’s visit. Instructed patient on how to check his blood glucose. Blood glucose at visit = 420 mg/dl. Repeat blood glucose = 410 mg/dl. Patient’s ARNP notified - per ARNP patient report to ED due to hyperglycemia. Patient was escorted to [facility] ED after appointment.     Patient’s A1c = 11.0%. We discussed the importance of exercise and diet adherence for the blood glucose control. We discussed consistent carb diet, meal planning and increased activity for blood glucose control. We reviewed reading food labels, counting carbs and using MyPlate to plan meals. He was advised to eat 60 grams of carb at breakfast, lunch and dinner and to eat 15 to 20 grams of carb at snacks. Patient verbalized understanding of all the topics we discussed during today’s visit.      Mr. Pavon has been encouraged to call our office with questions or additional education needs. Please place referral for additional services or follow-up should need arise.      Thank you for the referral  Karen Renteria, DARLIN, CHRISTIANO, ONEYDA

## 2022-10-25 DIAGNOSIS — E11.65 TYPE 2 DIABETES MELLITUS WITH HYPERGLYCEMIA, WITHOUT LONG-TERM CURRENT USE OF INSULIN: ICD-10-CM

## 2022-11-08 ENCOUNTER — TELEPHONE (OUTPATIENT)
Dept: FAMILY MEDICINE CLINIC | Facility: CLINIC | Age: 42
End: 2022-11-08

## 2022-11-08 ENCOUNTER — TELEMEDICINE (OUTPATIENT)
Dept: FAMILY MEDICINE CLINIC | Facility: CLINIC | Age: 42
End: 2022-11-08
Payer: COMMERCIAL

## 2022-11-08 DIAGNOSIS — Z53.21 PATIENT LEFT WITHOUT BEING SEEN: ICD-10-CM

## 2022-11-08 DIAGNOSIS — R50.9 FEVER IN ADULT: ICD-10-CM

## 2022-11-08 DIAGNOSIS — R05.1 ACUTE COUGH: Primary | ICD-10-CM

## 2022-11-08 DIAGNOSIS — J02.9 SORE THROAT: ICD-10-CM

## 2022-11-08 LAB
EXPIRATION DATE: NORMAL
FLUAV AG UPPER RESP QL IA.RAPID: NOT DETECTED
FLUBV AG UPPER RESP QL IA.RAPID: NOT DETECTED
INTERNAL CONTROL: NORMAL
Lab: NORMAL
SARS-COV-2 AG UPPER RESP QL IA.RAPID: NOT DETECTED

## 2022-11-08 PROCEDURE — 87428 SARSCOV & INF VIR A&B AG IA: CPT | Performed by: FAMILY MEDICINE

## 2022-11-08 NOTE — TELEPHONE ENCOUNTER
Video call visit was not successful today.  I tried with him on Mychart, he was not able to connect. Then I called him many time on Doximity which was unsuccessful  as well as he was not able to connect per his say.   I spoke to him via phone call , discussed about his current sx , he was evaluated in  few days ago, tested neg for STREP. HE IS ON abx.  I wait for > 20 minutes form him to connect on Doximkty but he was not able. During that time the MA swab him for FLU AND COVID. Both were neg  The MA went to his car to help him with Doximity call but he left.  I recalled him to discuss labs results, msg left     Luther Pineda MD

## 2023-10-18 NOTE — PROGRESS NOTES
Chief Complaint  Annual Exam (Physical - fasting - VELVET 0 PHQ 0)    Subjective        Kenny Pavon presents to [facility]  History of Present Illness  Kenny Pavon is a 43 y.o. male who presents to the clinic today for an annual physical exam and also to follow-up on type 2 diabetes.  Patient was previously seeing endocrinology, but has not seen them in a couple years.    Diabetes Mellitus Type II, Follow-up: Patient here for follow-up of Type 2 diabetes mellitus. Symptoms: numbness of the feet. Home monitoring: The patient is checking blood sugars at home. Medications: The patient is adherent with their medication regimen. Medication(s): Metformin 1000 mg twice daily, Jardiance 25 mg daily, glimepiride 4 mg 2 times daily, and Toujeo 60 units nightly. Due for: A1C, urine microalbumin, eye exam, foot exam, and pneumococcal vaccination. Patient is currently exercising. Patient has had prior visit with dietician. Weight trend: decreasing.     Annual Exam:  Diet: Patient has limited fast food in the diet and is eating salads for lunch instead.  He does eat frozen meals.  He drinks regular soda about 4-5 times a day.  He rarely eats sweets.   Exercise: He is exercising with walking a few times a week. .  Immunizations: Due for COVID-19 booster, Tdap, Prevnar 20, and influenza vaccine.    Colon cancer screening: Start at age 45.  Father did have colon polyps.  Sleep/mental health: He denies issues with sleep.  He does snore.  He does take trazodone on occasion.  He denies current anxiety or depression.    Sexual health: He is single.  Not currently sexually active.  Vision/dental: Due for routine vision exam.  Up-to-date with dentist.    Social history: He denies smoking, drug use, or alcohol use.  Family history: As listed below.     Health Maintenance   Topic Date Due    URINE MICROALBUMIN  Never done    HEPATITIS C SCREENING  Never done    DIABETIC EYE EXAM  Never done     "ANNUAL PHYSICAL  07/08/2021    LIPID PANEL  05/03/2023    COVID-19 Vaccine (4 - 2023-24 season) 09/01/2023    Hepatitis B (1 of 3 - 3-dose series) 10/19/2024 (Originally 1980)    Pneumococcal Vaccine 0-64 (1 - PCV) 10/19/2024 (Originally 1/20/1987)    TDAP/TD VACCINES (2 - Tdap) 10/19/2024 (Originally 4/8/2006)    HEMOGLOBIN A1C  04/19/2024    DIABETIC FOOT EXAM  10/19/2024    BMI FOLLOWUP  10/19/2024    INFLUENZA VACCINE  Completed         Family History   Problem Relation Age of Onset    Diabetes Mother     Diabetes Father     Colon polyps Father     Hypertension Sister     Diabetes Sister     Hypertension Sister     Diabetes Sister     No Known Problems Brother     Breast cancer Maternal Grandmother     Lung cancer Maternal Grandfather     Diabetes Paternal Grandmother     Diabetes Paternal Grandfather         Review of Systems   Constitutional: Negative.    HENT: Negative.     Eyes: Negative.    Respiratory: Negative.     Cardiovascular: Negative.    Gastrointestinal: Negative.    Endocrine: Positive for polydipsia.   Genitourinary: Negative.    Musculoskeletal: Negative.    Skin: Negative.    Neurological: Negative.    Psychiatric/Behavioral: Negative.        Objective   Vital Signs:  /70   Pulse 81   Temp 98 °F (36.7 °C)   Ht 180.3 cm (70.98\")   Wt 124 kg (274 lb)   SpO2 99%   BMI 38.23 kg/m²   Estimated body mass index is 38.23 kg/m² as calculated from the following:    Height as of this encounter: 180.3 cm (70.98\").    Weight as of this encounter: 124 kg (274 lb).       Class 3 Severe Obesity (BMI >=40). Obesity-related health conditions include the following: diabetes mellitus and dyslipidemias. Obesity is improving with lifestyle modifications. BMI is is above average; BMI management plan is completed. We discussed portion control and increasing exercise.      Physical Exam  Vitals reviewed.   Constitutional:       General: He is not in acute distress.     Appearance: Normal appearance. He " is well-developed. He is not ill-appearing, toxic-appearing or diaphoretic.   HENT:      Head: Normocephalic and atraumatic.   Eyes:      General: No scleral icterus.        Right eye: No discharge.         Left eye: No discharge.      Extraocular Movements: Extraocular movements intact.   Neck:      Thyroid: No thyroid mass, thyromegaly or thyroid tenderness.   Cardiovascular:      Rate and Rhythm: Normal rate and regular rhythm.      Heart sounds: Normal heart sounds.   Pulmonary:      Effort: Pulmonary effort is normal. No respiratory distress.      Breath sounds: Normal breath sounds. No wheezing.   Abdominal:      General: Bowel sounds are normal. There is no distension.      Palpations: Abdomen is soft.      Tenderness: There is no abdominal tenderness.   Musculoskeletal:         General: Normal range of motion.      Cervical back: Normal range of motion.      Right lower leg: No edema.      Left lower leg: No edema.   Skin:     General: Skin is warm.   Neurological:      General: No focal deficit present.      Mental Status: He is alert and oriented to person, place, and time.      Motor: No weakness.      Gait: Gait normal.   Psychiatric:         Mood and Affect: Mood normal.         Behavior: Behavior normal.      Diabetic foot exam: Skin integrity intact bilaterally. Calluses present.   Left: Filament test present   Pulses Dorsalis Pedis:  present  Posterior Tibial:  present     Right: Filament test present   Pulses Dorsalis Pedis:  present  Posterior Tibial:  present     Result Review :    A1C Last 3 Results          10/19/2023    08:10   HGBA1C Last 3 Results   Hemoglobin A1C 10.4                   Assessment and Plan   Diagnoses and all orders for this visit:    1. Encounter for health maintenance examination in adult (Primary)    2. Uncontrolled type 2 diabetes mellitus with hyperglycemia  Assessment & Plan:  Diabetes is worsening.  Hemoglobin A1c 10.5 today.  Plan:  Continue metformin, Jardiance, and  glimepiride as prescribed.  Increase Toujeo to 65 units nightly.  Due for microalbumin check today.  Check fasting glucose levels daily.  Due for diabetic eye exam.  Declines Prevnar 20.  Continue simvastatin.  Referral to endocrinology.  Stop regular soda.  Increase cardiovascular exercise.  Reviewed evaluate hemoglobin A1c in 3 months      Orders:  -     POC Glycosylated Hemoglobin (Hb A1C)  -     Ambulatory Referral to Endocrinology  -     Insulin Glargine, 1 Unit Dial, (Toujeo SoloStar) 300 UNIT/ML solution pen-injector injection; Inject 65 Units under the skin into the appropriate area as directed Every Night.  Dispense: 2 mL; Refill: 0  -     Microalbumin / Creatinine Urine Ratio - Urine, Clean Catch  -     Comprehensive metabolic panel    3. Need for vaccination  -     Fluzone >6 Months ([id number])    4. Screening for thyroid disorder  -     TSH    5. Mixed hyperlipidemia  -     Lipid Panel    6. Need for hepatitis C screening test  -     Hepatitis C antibody    7. Screening for deficiency anemia  -     CBC w AUTO Differential    8. Vitamin D deficiency  -     Vitamin D 25 hydroxy      Preventative counseling: Patient presents today for an annual physical exam.  Patient's blood pressure is within normal limits.  We discussed diet and exercise modifications.  He needs to decrease regular soda.  Agreeable to influenza vaccine.  Declines Prevnar 20, Tdap, hepatitis B, and COVID-19 booster.  He is due for his diabetic eye exam.  Start colonoscopies at the age of 46.  Patient does snore.  I did recommend a sleep study.  Patient will consider this.         Follow Up   Return in about 3 months (around 1/19/2024), or if symptoms worsen or fail to improve, for Recheck.  Patient was given instructions and counseling regarding his condition or for health maintenance advice. Please see specific information pulled into the AVS if appropriate.       Electronically signed by CARLOS MANUEL Noguera, 10/19/23, 9:06 AM EDT.

## 2023-10-19 ENCOUNTER — OFFICE VISIT (OUTPATIENT)
Dept: FAMILY MEDICINE CLINIC | Facility: CLINIC | Age: 43
End: 2023-10-19
Payer: COMMERCIAL

## 2023-10-19 VITALS
HEIGHT: 71 IN | OXYGEN SATURATION: 99 % | WEIGHT: 274 LBS | DIASTOLIC BLOOD PRESSURE: 70 MMHG | HEART RATE: 81 BPM | TEMPERATURE: 98 F | SYSTOLIC BLOOD PRESSURE: 126 MMHG | BODY MASS INDEX: 38.36 KG/M2

## 2023-10-19 DIAGNOSIS — E55.9 VITAMIN D DEFICIENCY: ICD-10-CM

## 2023-10-19 DIAGNOSIS — E78.2 MIXED HYPERLIPIDEMIA: ICD-10-CM

## 2023-10-19 DIAGNOSIS — E11.65 UNCONTROLLED TYPE 2 DIABETES MELLITUS WITH HYPERGLYCEMIA: ICD-10-CM

## 2023-10-19 DIAGNOSIS — Z13.0 SCREENING FOR DEFICIENCY ANEMIA: ICD-10-CM

## 2023-10-19 DIAGNOSIS — Z13.29 SCREENING FOR THYROID DISORDER: ICD-10-CM

## 2023-10-19 DIAGNOSIS — Z11.59 NEED FOR HEPATITIS C SCREENING TEST: ICD-10-CM

## 2023-10-19 DIAGNOSIS — Z23 NEED FOR VACCINATION: ICD-10-CM

## 2023-10-19 DIAGNOSIS — Z00.00 ENCOUNTER FOR HEALTH MAINTENANCE EXAMINATION IN ADULT: Primary | ICD-10-CM

## 2023-10-19 LAB
EXPIRATION DATE: ABNORMAL
HBA1C MFR BLD: 10.4 % (ref 4.5–5.7)
Lab: ABNORMAL

## 2023-10-19 RX ORDER — INSULIN GLARGINE 300 U/ML
65 INJECTION, SOLUTION SUBCUTANEOUS NIGHTLY
Qty: 2 ML | Refills: 0 | Status: SHIPPED | OUTPATIENT
Start: 2023-10-19

## 2023-10-19 NOTE — ASSESSMENT & PLAN NOTE
Diabetes is worsening.  Hemoglobin A1c 10.5 today.  Plan:  Continue metformin, Jardiance, and glimepiride as prescribed.  Increase Toujeo to 65 units nightly.  Due for microalbumin check today.  Check fasting glucose levels daily.  Due for diabetic eye exam.  Declines Prevnar 20.  Continue simvastatin.  Referral to endocrinology.  Stop regular soda.  Increase cardiovascular exercise.  Reviewed evaluate hemoglobin A1c in 3 months

## 2023-10-20 LAB
25(OH)D3+25(OH)D2 SERPL-MCNC: 24.4 NG/ML (ref 30–100)
ALBUMIN SERPL-MCNC: 4.6 G/DL (ref 3.5–5.2)
ALBUMIN/CREAT UR: 41 MG/G CREAT (ref 0–29)
ALBUMIN/GLOB SERPL: 1.8 G/DL
ALP SERPL-CCNC: 93 U/L (ref 39–117)
ALT SERPL-CCNC: 69 U/L (ref 1–41)
AST SERPL-CCNC: 41 U/L (ref 1–40)
BASOPHILS # BLD AUTO: 0.07 10*3/MM3 (ref 0–0.2)
BASOPHILS NFR BLD AUTO: 1 % (ref 0–1.5)
BILIRUB SERPL-MCNC: 0.5 MG/DL (ref 0–1.2)
BUN SERPL-MCNC: 13 MG/DL (ref 6–20)
BUN/CREAT SERPL: 15.9 (ref 7–25)
CALCIUM SERPL-MCNC: 10.1 MG/DL (ref 8.6–10.5)
CHLORIDE SERPL-SCNC: 100 MMOL/L (ref 98–107)
CHOLEST SERPL-MCNC: 186 MG/DL (ref 0–200)
CO2 SERPL-SCNC: 22.3 MMOL/L (ref 22–29)
CREAT SERPL-MCNC: 0.82 MG/DL (ref 0.76–1.27)
CREAT UR-MCNC: 156.8 MG/DL
EGFRCR SERPLBLD CKD-EPI 2021: 112.5 ML/MIN/1.73
EOSINOPHIL # BLD AUTO: 0.16 10*3/MM3 (ref 0–0.4)
EOSINOPHIL NFR BLD AUTO: 2.3 % (ref 0.3–6.2)
ERYTHROCYTE [DISTWIDTH] IN BLOOD BY AUTOMATED COUNT: 12.2 % (ref 12.3–15.4)
GLOBULIN SER CALC-MCNC: 2.6 GM/DL
GLUCOSE SERPL-MCNC: 252 MG/DL (ref 65–99)
HCT VFR BLD AUTO: 50.7 % (ref 37.5–51)
HCV IGG SERPL QL IA: NON REACTIVE
HDLC SERPL-MCNC: 40 MG/DL (ref 40–60)
HGB BLD-MCNC: 17.7 G/DL (ref 13–17.7)
IMM GRANULOCYTES # BLD AUTO: 0.02 10*3/MM3 (ref 0–0.05)
IMM GRANULOCYTES NFR BLD AUTO: 0.3 % (ref 0–0.5)
LDLC SERPL CALC-MCNC: 115 MG/DL (ref 0–100)
LYMPHOCYTES # BLD AUTO: 2.05 10*3/MM3 (ref 0.7–3.1)
LYMPHOCYTES NFR BLD AUTO: 28.9 % (ref 19.6–45.3)
MCH RBC QN AUTO: 30.9 PG (ref 26.6–33)
MCHC RBC AUTO-ENTMCNC: 34.9 G/DL (ref 31.5–35.7)
MCV RBC AUTO: 88.5 FL (ref 79–97)
MICROALBUMIN UR-MCNC: 63.8 UG/ML
MONOCYTES # BLD AUTO: 0.38 10*3/MM3 (ref 0.1–0.9)
MONOCYTES NFR BLD AUTO: 5.4 % (ref 5–12)
NEUTROPHILS # BLD AUTO: 4.41 10*3/MM3 (ref 1.7–7)
NEUTROPHILS NFR BLD AUTO: 62.1 % (ref 42.7–76)
NRBC BLD AUTO-RTO: 0 /100 WBC (ref 0–0.2)
PLATELET # BLD AUTO: 295 10*3/MM3 (ref 140–450)
POTASSIUM SERPL-SCNC: 4.4 MMOL/L (ref 3.5–5.2)
PROT SERPL-MCNC: 7.2 G/DL (ref 6–8.5)
RBC # BLD AUTO: 5.73 10*6/MM3 (ref 4.14–5.8)
SODIUM SERPL-SCNC: 137 MMOL/L (ref 136–145)
TRIGL SERPL-MCNC: 178 MG/DL (ref 0–150)
TSH SERPL DL<=0.005 MIU/L-ACNC: 2.34 UIU/ML (ref 0.27–4.2)
VLDLC SERPL CALC-MCNC: 31 MG/DL (ref 5–40)
WBC # BLD AUTO: 7.09 10*3/MM3 (ref 3.4–10.8)

## 2023-10-24 ENCOUNTER — TELEPHONE (OUTPATIENT)
Dept: PEDIATRICS | Facility: OTHER | Age: 43
End: 2023-10-24
Payer: COMMERCIAL

## 2023-10-24 DIAGNOSIS — E11.65 UNCONTROLLED TYPE 2 DIABETES MELLITUS WITH HYPERGLYCEMIA: ICD-10-CM

## 2023-10-24 RX ORDER — INSULIN GLARGINE 300 U/ML
65 INJECTION, SOLUTION SUBCUTANEOUS NIGHTLY
Qty: 2 ML | Refills: 0 | Status: SHIPPED | OUTPATIENT
Start: 2023-10-24 | End: 2023-10-27

## 2023-10-24 NOTE — TELEPHONE ENCOUNTER
Caller: Kenny Pavon    Relationship: Self    Best call back number: [phone]     What form or medical record are you requesting: [employer] SCREEN RESULTS    Who is requesting this form or medical record from you: SELF    How would you like to receive the form or medical records (pick-up, mail, fax):  AND FAX    If fax, what is the fax number: [phone]      Timeframe paperwork needed: ASAP    Additional notes: PATIENT IS REQUESTING TO HAVE HIS RESULTS FAXED TO  AND IS ALSO REQUESTING TO HAVE A PAPER COPY THAT HE WILL COME BY AND .     PLEASE CALL AND ADVISE WHEN PAPER WORK IS READY FOR

## 2023-10-24 NOTE — TELEPHONE ENCOUNTER
Caller: [place]    Relationship: Self    Best call back number: [phone]     Requested Prescriptions:   Requested Prescriptions     Pending Prescriptions Disp Refills    Insulin Glargine, 1 Unit Dial, (Toujeo SoloStar) 300 UNIT/ML solution pen-injector injection 2 mL 0     Sig: Inject 65 Units under the skin into the appropriate area as directed Every Night.        Pharmacy where request should be sent: [facility] - [facility] [address] - [phone] [facility] [phone]      Last office visit with prescribing clinician: 10/19/2023   Last telemedicine visit with prescribing clinician: Visit date not found   Next office visit with prescribing clinician: 1/22/2024     Additional details provided by patient: PATIENT STATES HE HAS ABOUT 2 WEEKS LEFT    Does the patient have less than a 3 day supply:  [] Yes  [x] No    Would you like a call back once the refill request has been completed: [] Yes [x] No    If the office needs to give you a call back, can they leave a voicemail: [] Yes [x] No    Yosvany Richardson Rep   10/24/23 12:36 EDT

## 2023-10-25 DIAGNOSIS — R80.9 POSITIVE FOR MICROALBUMINURIA: ICD-10-CM

## 2023-10-25 DIAGNOSIS — E11.65 UNCONTROLLED TYPE 2 DIABETES MELLITUS WITH HYPERGLYCEMIA: Primary | ICD-10-CM

## 2023-10-27 DIAGNOSIS — E11.65 UNCONTROLLED TYPE 2 DIABETES MELLITUS WITH HYPERGLYCEMIA: ICD-10-CM

## 2023-10-27 RX ORDER — INSULIN GLARGINE 300 U/ML
INJECTION, SOLUTION SUBCUTANEOUS
Qty: 1.5 ML | Refills: 1 | Status: SHIPPED | OUTPATIENT
Start: 2023-10-27

## 2023-11-07 DIAGNOSIS — E11.65 UNCONTROLLED TYPE 2 DIABETES MELLITUS WITH HYPERGLYCEMIA: ICD-10-CM

## 2023-11-07 RX ORDER — INSULIN GLARGINE 300 U/ML
INJECTION, SOLUTION SUBCUTANEOUS
Qty: 1.5 ML | Refills: 1 | Status: SHIPPED | OUTPATIENT
Start: 2023-11-07

## 2024-01-22 ENCOUNTER — OFFICE VISIT (OUTPATIENT)
Dept: FAMILY MEDICINE CLINIC | Facility: CLINIC | Age: 44
End: 2024-01-22
Payer: COMMERCIAL

## 2024-01-22 ENCOUNTER — TELEPHONE (OUTPATIENT)
Dept: FAMILY MEDICINE CLINIC | Facility: CLINIC | Age: 44
End: 2024-01-22

## 2024-01-22 VITALS
BODY MASS INDEX: 39.48 KG/M2 | OXYGEN SATURATION: 98 % | TEMPERATURE: 98 F | HEIGHT: 71 IN | DIASTOLIC BLOOD PRESSURE: 76 MMHG | HEART RATE: 80 BPM | SYSTOLIC BLOOD PRESSURE: 128 MMHG | WEIGHT: 282 LBS

## 2024-01-22 DIAGNOSIS — E11.65 UNCONTROLLED TYPE 2 DIABETES MELLITUS WITH HYPERGLYCEMIA: ICD-10-CM

## 2024-01-22 DIAGNOSIS — E11.65 TYPE 2 DIABETES MELLITUS WITH HYPERGLYCEMIA, WITHOUT LONG-TERM CURRENT USE OF INSULIN: ICD-10-CM

## 2024-01-22 DIAGNOSIS — E11.65 UNCONTROLLED TYPE 2 DIABETES MELLITUS WITH HYPERGLYCEMIA: Primary | ICD-10-CM

## 2024-01-22 LAB
EXPIRATION DATE: ABNORMAL
HBA1C MFR BLD: 11.2 % (ref 4.5–5.7)
Lab: ABNORMAL

## 2024-01-22 PROCEDURE — 99214 OFFICE O/P EST MOD 30 MIN: CPT | Performed by: NURSE PRACTITIONER

## 2024-01-22 PROCEDURE — 83036 HEMOGLOBIN GLYCOSYLATED A1C: CPT | Performed by: NURSE PRACTITIONER

## 2024-01-22 RX ORDER — INSULIN GLARGINE 300 U/ML
35 INJECTION, SOLUTION SUBCUTANEOUS 2 TIMES DAILY
Qty: 6 ML | Refills: 1 | Status: SHIPPED | OUTPATIENT
Start: 2024-01-22 | End: 2024-01-25 | Stop reason: SDUPTHER

## 2024-01-22 RX ORDER — INSULIN GLARGINE 300 U/ML
35 INJECTION, SOLUTION SUBCUTANEOUS 2 TIMES DAILY
Qty: 1.5 ML | Refills: 1 | Status: SHIPPED | OUTPATIENT
Start: 2024-01-22 | End: 2024-01-22 | Stop reason: SDUPTHER

## 2024-01-22 RX ORDER — INSULIN GLARGINE 300 U/ML
35 INJECTION, SOLUTION SUBCUTANEOUS 2 TIMES DAILY
Qty: 9 ML | Refills: 1 | Status: CANCELLED | OUTPATIENT
Start: 2024-01-22

## 2024-01-22 NOTE — TELEPHONE ENCOUNTER
Patient states prescription for Toujeo is incorrect,  he needs 6 pens total. Patient also states metformin was not called into pharmacy. Patient would like script for both medications faxed over to mail over pharmacy [facility] at [phone]. Please advise, thank you.

## 2024-01-22 NOTE — PROGRESS NOTES
"Chief Complaint  Diabetes (3M fu - fasting - Pt states insulin and Metformin make him \"feel horrible\" )    Subjective        Kenny Pavon presents to [facility]  History of Present Illness  Kenny Pavon is a 44 y.o.  male who presents to the clinic today for a routine follow-up. His last visit was October 2023.  Hemoglobin A1c at that time was 10.5.  Patient's metformin, Jardiance, glimepiride were continued as prescribed, but Toujeo was increased to 65 units nightly.  He was referred to endocrinology, but states he did not get notified. Symptoms: numbness of the feet, nausea, polydipsia, and visual disturbances. Home monitoring: The patient is not checking blood sugars at home. Medications: The patient is not adherent with their medication regimen. Medication(s): Jardiance 25 mg daily, glimepiride 4 mg twice daily, metformin 1,000 mg twice daily, and Toujeo 65 units nightly. Due for: A1C and eye exam. Patient is not currently exercising. Patient has had prior visit with dietician. Weight trend: increasing. He did eat more during the holidays.  He is drinking regular sodas.  He is not exercising.       Review of Systems   Constitutional:  Negative for chills, fatigue and fever.   Endocrine: Positive for polydipsia. Negative for polyphagia and polyuria.   Neurological:  Positive for numbness (Feet).      Objective   Vital Signs:  /76   Pulse 80   Temp 98 °F (36.7 °C)   Ht 180.3 cm (70.98\")   Wt 128 kg (282 lb)   SpO2 98%   BMI 39.35 kg/m²   Estimated body mass index is 39.35 kg/m² as calculated from the following:    Height as of this encounter: 180.3 cm (70.98\").    Weight as of this encounter: 128 kg (282 lb).           Physical Exam  Vitals reviewed.   Constitutional:       General: He is not in acute distress.     Appearance: Normal appearance. He is not ill-appearing, toxic-appearing or diaphoretic.   HENT:      Head: Normocephalic and atraumatic. "   Eyes:      General: No scleral icterus.        Right eye: No discharge.         Left eye: No discharge.      Extraocular Movements: Extraocular movements intact.   Pulmonary:      Effort: Pulmonary effort is normal. No respiratory distress.   Neurological:      General: No focal deficit present.      Mental Status: He is alert and oriented to person, place, and time.      Motor: No weakness.      Gait: Gait normal.   Psychiatric:         Mood and Affect: Mood normal.         Behavior: Behavior normal.        Result Review :      A1C Last 3 Results          10/19/2023    08:10 1/22/2024    07:59   HGBA1C Last 3 Results   Hemoglobin A1C 10.4  11.2      Microalbumin          10/19/2023    08:32   Microalbumin   Microalbumin, Urine 63.8                   Assessment and Plan     Diagnoses and all orders for this visit:    1. Uncontrolled type 2 diabetes mellitus with hyperglycemia (Primary)  Assessment & Plan:  Diabetes is worsening.  Hemoglobin A1c is up to 11.2 despite Toujeo changes.   Patient is symptomatic with hyperglycemia.  Continue Jardiance, glimepiride, and metformin as prescribed. Toujeo increased to twice daily dosing.   Discussed the concerns with continued hyperglycemia and chronic impact to the kidneys, eyes, and vascular system.  Patient was given information on ophthalmologist and advised to schedule diabetic eye exam in the next 1 to 2 weeks.  Discussed the importance of monitoring his glucose levels twice daily.  Referred to endocrinology again. Looks like he was scheduled, but cancelled on interface. Patient advised to call office if he does not hear in 1 week.  Discussed low-carb diet and avoiding added sugars.  Diabetes will be reassessed in 3 months.    Orders:  -     POC Glycosylated Hemoglobin (Hb A1C)  -     Insulin Glargine, 1 Unit Dial, (Toujeo SoloStar) 300 UNIT/ML solution pen-injector injection; Inject 35 Units under the skin into the appropriate area as directed 2 (Two) Times a Day.   Dispense: 1.5 mL; Refill: 1  -     Ambulatory Referral to Endocrinology             Follow Up     Return in about 3 months (around 4/22/2024) for Recheck.  Patient was given instructions and counseling regarding his condition or for health maintenance advice. Please see specific information pulled into the AVS if appropriate.       Electronically signed by CARLOS MANUEL Noguera, 01/22/24, 8:24 AM EST.

## 2024-01-22 NOTE — ASSESSMENT & PLAN NOTE
Diabetes is worsening.  Hemoglobin A1c is up to 11.2 despite Toujeo changes.   Patient is symptomatic with hyperglycemia.  Continue Jardiance, glimepiride, and metformin as prescribed. Toujeo increased to twice daily dosing.   Discussed the concerns with continued hyperglycemia and chronic impact to the kidneys, eyes, and vascular system.  Patient was given information on ophthalmologist and advised to schedule diabetic eye exam in the next 1 to 2 weeks.  Discussed the importance of monitoring his glucose levels twice daily.  Referred to endocrinology again. Looks like he was scheduled, but cancelled on interface. Patient advised to call office if he does not hear in 1 week.  Discussed low-carb diet and avoiding added sugars.  Diabetes will be reassessed in 3 months.  
initiate/review hand expression/initiate/review techniques for position and latch/post discharge community resources provided/initiate/review supplementation plan due to medical indications/review techniques to increase milk supply/review techniques to manage sore nipples/engorgement/initiate/review breast massage/compression/reviewed components of an effective feeding and at least 8 effective feedings per day required/reviewed importance of monitoring infant diapers, the breastfeeding log, and minimum output each day/reviewed risks of unnecessary formula supplementation/reviewed risks of artificial nipples/reviewed feeding on demand/by cue at least 8 times a day/recommended follow-up with pediatrician within 24 hours of discharge/reviewed indications of inadequate milk transfer that would require supplementation
initiate/review safe skin-to-skin/initiate/review hand expression/reverse pressure softening/initiate/review techniques for position and latch/post discharge community resources provided/review techniques to increase milk supply/review techniques to manage sore nipples/engorgement/initiate/review breast massage/compression/reviewed components of an effective feeding and at least 8 effective feedings per day required/reviewed importance of monitoring infant diapers, the breastfeeding log, and minimum output each day/reviewed risks of unnecessary formula supplementation/reviewed benefits and recommendations for rooming in/reviewed feeding on demand/by cue at least 8 times a day/recommended follow-up with pediatrician within 24 hours of discharge

## 2024-01-23 NOTE — TELEPHONE ENCOUNTER
Caller: Kenny Pavon    Relationship to patient: Self    Best call back number: [phone]     Patient is needing: AN UPDATE IF MEDICATION HAS BEEN SENT TO MAIL ORDER PHARMACY

## 2024-01-25 ENCOUNTER — PATIENT MESSAGE (OUTPATIENT)
Dept: FAMILY MEDICINE CLINIC | Facility: CLINIC | Age: 44
End: 2024-01-25
Payer: COMMERCIAL

## 2024-01-25 DIAGNOSIS — E11.65 UNCONTROLLED TYPE 2 DIABETES MELLITUS WITH HYPERGLYCEMIA: ICD-10-CM

## 2024-01-25 DIAGNOSIS — E11.65 TYPE 2 DIABETES MELLITUS WITH HYPERGLYCEMIA, WITHOUT LONG-TERM CURRENT USE OF INSULIN: ICD-10-CM

## 2024-01-25 RX ORDER — INSULIN GLARGINE 300 U/ML
35 INJECTION, SOLUTION SUBCUTANEOUS 2 TIMES DAILY
Qty: 9 ML | Refills: 1 | Status: SHIPPED | OUTPATIENT
Start: 2024-01-25

## 2024-01-25 NOTE — TELEPHONE ENCOUNTER
From: Kenny Pavon  To: Holli Chu  Sent: 1/25/2024 10:28 AM EST  Subject: Medications    [facility] only had 1 toujeo pin. I probably need 6 There was no metforman scripts to . I need more of both.     Also, I called into my insurance company and the scripts would save me $1000 if you would send them to mail order. The fax number is [phone]. Can you resend those scripts to that fax number?

## 2024-03-11 ENCOUNTER — OFFICE VISIT (OUTPATIENT)
Dept: ENDOCRINOLOGY | Age: 44
End: 2024-03-11
Payer: COMMERCIAL

## 2024-03-11 VITALS
HEART RATE: 105 BPM | BODY MASS INDEX: 40.07 KG/M2 | SYSTOLIC BLOOD PRESSURE: 134 MMHG | DIASTOLIC BLOOD PRESSURE: 78 MMHG | HEIGHT: 71 IN | WEIGHT: 286.2 LBS | OXYGEN SATURATION: 96 % | TEMPERATURE: 97.1 F

## 2024-03-11 DIAGNOSIS — E78.2 MIXED HYPERLIPIDEMIA: Chronic | ICD-10-CM

## 2024-03-11 DIAGNOSIS — E11.65 UNCONTROLLED TYPE 2 DIABETES MELLITUS WITH HYPERGLYCEMIA: Primary | ICD-10-CM

## 2024-03-11 PROBLEM — Z13.1 SCREENING FOR DIABETES MELLITUS: Status: RESOLVED | Noted: 2019-09-05 | Resolved: 2024-03-11

## 2024-03-11 PROCEDURE — 99214 OFFICE O/P EST MOD 30 MIN: CPT | Performed by: NURSE PRACTITIONER

## 2024-03-11 RX ORDER — INSULIN LISPRO 100 [IU]/ML
10 INJECTION, SOLUTION INTRAVENOUS; SUBCUTANEOUS
Qty: 30 ML | Refills: 1 | Status: SHIPPED | OUTPATIENT
Start: 2024-03-11

## 2024-03-11 NOTE — PATIENT INSTRUCTIONS
Stop glimepiride 4 mg twice daily when you start Humalog  Continue with Jardiance 25 mg daily  Continue with metformin 1,000 mg twice daily   Continue with Toujeo 35 units twice daily  Start Humalog 10 units before each meal  Please check blood sugars at least 3 times a day - in the morning when you get up, before lunch and before dinner

## 2024-03-11 NOTE — PROGRESS NOTES
"Chief Complaint  Chief Complaint   Patient presents with    Diabetes     Type 2: Pt doesn't have meter today, checks bs every morning, is not up to date on eye exam, no hx of retinopathy or neuropathy.        Subjective          History of Present Illness    Kenny Pavon 44 y.o. presents for a follow-up evaluation for type 2 DM    Last seen in office 07/15/22 with Dr. Doyle    He was diagnosed with diabetes around 2020      Family history of diabetes: Grandparents and parents      Pt is on the following medications for their DM: Jardiance 25 mg daily, glimepiride 4 mg twice daily, metformin 1,000 mg twice daily and Toujeo 35 units twice daily      Past medications: none      Pt complains of intermittent diarrhea    Denies constipation, chest pain, shortness of breath, vision changes or numbness and tingling in feet/hands.      Last eye exam was unknown    Pt does have nephropathy.  Patient is not currently taking ACE/ARB    Pt denies neuropathy.    Pt denies a history of CAD or CVA.    Last A1C in 01/24 was 11.2    Last microalbumin in 10/23 was positive          Blood Sugars    Blood glucoses are checked 1/day.    Fasting blood glucoses: right at or below 200    Pt has no episodes of hypoglycemia.            Hyperlipidemia     Pt is currently taking simvastatin 40 mg HS    Last lipid panel in 10/23 showed Total 186, HDL 40,  and Triglycerides 178            I have reviewed the patient's allergies, medicines, past medical hx, family hx and social hx.    Objective   Vital Signs:   /78   Pulse 105   Temp 97.1 °F (36.2 °C) (Temporal)   Ht 180.3 cm (70.98\")   Wt 130 kg (286 lb 3.2 oz)   SpO2 96%   BMI 39.93 kg/m²       Physical Exam   Physical Exam  Constitutional:       General: He is not in acute distress.     Appearance: Normal appearance. He is obese. He is not diaphoretic.   HENT:      Head: Normocephalic and atraumatic.   Eyes:      General:         Right eye: No discharge.         " Left eye: No discharge.   Skin:     General: Skin is warm and dry.   Neurological:      Mental Status: He is alert.   Psychiatric:         Mood and Affect: Mood normal.         Behavior: Behavior normal.                    Results Review:   Hemoglobin A1C   Date Value Ref Range Status   01/22/2024 11.2 (A) 4.5 - 5.7 % Final     Triglycerides   Date Value Ref Range Status   10/19/2023 178 (H) 0 - 150 mg/dL Final     HDL Cholesterol   Date Value Ref Range Status   10/19/2023 40 40 - 60 mg/dL Final     LDL Chol Calc (NIH)   Date Value Ref Range Status   10/19/2023 115 (H) 0 - 100 mg/dL Final     VLDL Cholesterol Bonifacio   Date Value Ref Range Status   10/19/2023 31 5 - 40 mg/dL Final     LDL/HDL RATIO   Date Value Ref Range Status   05/03/2022 3.2 0.0 - 3.6 ratio Final     Comment:                                         LDL/HDL Ratio                                              Men  Women                                1/2 Avg.Risk  1.0    1.5                                    Avg.Risk  3.6    3.2                                 2X Avg.Risk  6.2    5.0                                 3X Avg.Risk  8.0    6.1           Assessment and Plan {CC Problem List  Visit Diagnosis  ROS  Review (Popup)  Health Maintenance  Quality  BestPractice  Medications  SmartSets  SnapShot Encounters  Media :23  Diagnoses and all orders for this visit:    1. Uncontrolled type 2 diabetes mellitus with hyperglycemia (Primary)  -     HumaLOG KwikPen 100 UNIT/ML solution pen-injector; Inject 10 Units under the skin into the appropriate area as directed 3 (Three) Times a Day Before Meals.  Dispense: 30 mL; Refill: 1    A1c is elevated at 11.2 - A1c has been in double digits since 2020 - basal/bolus is needed  Could consider GLP1 down the road, if a candidate   Stop glimepiride 4 mg twice daily when you start Humalog  Continue with Jardiance 25 mg daily  Continue with metformin 1,000 mg twice daily   Continue with Toujeo 35 units twice  "daily  Start Humalog 10 units before each meal  Please check blood sugars at least 3 times a day - in the morning when you get up, before lunch and before dinner   Pt refused CGM stating that they were \"uncomfortable\"  Advised for annual diabetic eye exam      2. Mixed hyperlipidemia    Continue with statin          RTC in 2 months with me      Follow Up     Patient was given instructions and counseling regarding her condition or for health maintenance advice. Please see specific information pulled into the AVS if appropriate.              Farheen Garcia, APRN  03/11/24             "

## 2024-04-22 ENCOUNTER — OFFICE VISIT (OUTPATIENT)
Dept: FAMILY MEDICINE CLINIC | Facility: CLINIC | Age: 44
End: 2024-04-22
Payer: COMMERCIAL

## 2024-04-22 VITALS
HEART RATE: 82 BPM | TEMPERATURE: 98 F | HEIGHT: 71 IN | OXYGEN SATURATION: 98 % | WEIGHT: 283 LBS | DIASTOLIC BLOOD PRESSURE: 78 MMHG | BODY MASS INDEX: 39.62 KG/M2 | SYSTOLIC BLOOD PRESSURE: 126 MMHG

## 2024-04-22 DIAGNOSIS — R80.9 POSITIVE FOR MICROALBUMINURIA: ICD-10-CM

## 2024-04-22 DIAGNOSIS — E11.65 UNCONTROLLED TYPE 2 DIABETES MELLITUS WITH HYPERGLYCEMIA: Primary | ICD-10-CM

## 2024-04-22 LAB
EXPIRATION DATE: ABNORMAL
HBA1C MFR BLD: 11.6 % (ref 4.5–5.7)
Lab: ABNORMAL

## 2024-04-22 PROCEDURE — 99214 OFFICE O/P EST MOD 30 MIN: CPT | Performed by: NURSE PRACTITIONER

## 2024-04-22 PROCEDURE — 83036 HEMOGLOBIN GLYCOSYLATED A1C: CPT | Performed by: NURSE PRACTITIONER

## 2024-04-22 RX ORDER — INSULIN GLARGINE 300 U/ML
35 INJECTION, SOLUTION SUBCUTANEOUS 2 TIMES DAILY
Qty: 9 ML | Refills: 1 | Status: SHIPPED | OUTPATIENT
Start: 2024-04-22

## 2024-04-22 RX ORDER — INSULIN LISPRO 100 [IU]/ML
10 INJECTION, SOLUTION INTRAVENOUS; SUBCUTANEOUS
Qty: 30 ML | Refills: 1 | Status: SHIPPED | OUTPATIENT
Start: 2024-04-22

## 2024-04-22 NOTE — PROGRESS NOTES
"Chief Complaint  Diabetes (3M fu - will establish w endo 5/10/24)    Subjective        Kenny Pavon presents to [facility]  History of Present Illness  Kenny Pavon is a 44 y.o. male who preents for a 3 month follow-up appointment.  He was seen in January 2024 for type 2 diabetes follow-up.  Hemoglobin A1c at that time was 11.2.  He was symptomatic with increased urination and neuropathy.  He was referred to endocrinology. He was recently started on humalog. He was continued on Jardiance 25 mg daily and metformin 1,000 mg twice daily. However, the cost of the Humalog was too expensive, so he has not been taking it. He has not make diet changes, but he has been walking. He denies polyuria. He has on occasion neuropathy in feet, but it does not inhibit his activities of daily living. He has met with diabetic nutritionist in the past. Needs diabetic eye exam. He denies other concerning symptoms today.     Objective   Vital Signs:  /78   Pulse 82   Temp 98 °F (36.7 °C)   Ht 180.3 cm (70.98\")   Wt 128 kg (283 lb)   SpO2 98%   BMI 39.49 kg/m²   Estimated body mass index is 39.49 kg/m² as calculated from the following:    Height as of this encounter: 180.3 cm (70.98\").    Weight as of this encounter: 128 kg (283 lb).               Physical Exam  Vitals reviewed.   Constitutional:       General: He is not in acute distress.     Appearance: Normal appearance. He is not ill-appearing, toxic-appearing or diaphoretic.   HENT:      Head: Normocephalic and atraumatic.   Eyes:      General: No scleral icterus.        Right eye: No discharge.         Left eye: No discharge.      Extraocular Movements: Extraocular movements intact.   Pulmonary:      Effort: Pulmonary effort is normal. No respiratory distress.   Neurological:      General: No focal deficit present.      Mental Status: He is alert and oriented to person, place, and time.      Motor: No weakness.      Gait: " Gait normal.   Psychiatric:         Mood and Affect: Mood normal.         Behavior: Behavior normal.        Result Review :      Common labs          10/19/2023    08:10 10/19/2023    08:32 1/22/2024    07:59 4/22/2024    07:57   Common Labs   Glucose  252      BUN  13      Creatinine  0.82      Sodium  137      Potassium  4.4      Chloride  100      Calcium  10.1      Total Protein  7.2      Albumin  4.6      Total Bilirubin  0.5      Alkaline Phosphatase  93      AST (SGOT)  41      ALT (SGPT)  69      WBC  7.09      Hemoglobin  17.7      Hematocrit  50.7      Platelets  295      Total Cholesterol  186      Triglycerides  178      HDL Cholesterol  40      LDL Cholesterol   115      Hemoglobin A1C 10.4   11.2  11.6    Microalbumin, Urine  63.8        A1C Last 3 Results          10/19/2023    08:10 1/22/2024    07:59 4/22/2024    07:57   HGBA1C Last 3 Results   Hemoglobin A1C 10.4  11.2  11.6      Microalbumin          10/19/2023    08:32   Microalbumin   Microalbumin, Urine 63.8                   Assessment and Plan     Diagnoses and all orders for this visit:    1. Uncontrolled type 2 diabetes mellitus with hyperglycemia (Primary)  Assessment & Plan:  Hemoglobin A1c 11.6.  I will defer to endocrinology for management medication adjustment.  Patient has a follow-up 5/10/2024.  To lower cost, Humalog has been sent into his mail order pharmacy.  Discussed diet modifications.  Continue simvastatin.  Blood pressure controlled.  Discussed recommendation for yearly diabetic eye exam.  Positive microalbuminuria.  He was referred to nephrology in the past, but not scheduled.  Referral replaced today.  Check CMP.    Orders:  -     POC Glycosylated Hemoglobin (Hb A1C)  -     Insulin Glargine, 1 Unit Dial, (Toujeo SoloStar) 300 UNIT/ML solution pen-injector injection; Inject 35 Units under the skin into the appropriate area as directed 2 (Two) Times a Day.  Dispense: 9 mL; Refill: 1  -     metFORMIN (GLUCOPHAGE) 1000 MG  tablet; Take 1 tablet by mouth 2 (Two) Times a Day With Meals.  Dispense: 180 tablet; Refill: 1  -     HumaLOG KwikPen 100 UNIT/ML solution pen-injector; Inject 10 Units under the skin into the appropriate area as directed 3 (Three) Times a Day Before Meals.  Dispense: 30 mL; Refill: 1    2. Positive for microalbuminuria  -     Ambulatory Referral to Nephrology  -     Comprehensive metabolic panel               Follow Up     Return in about 6 months (around 10/22/2024), or if symptoms worsen or fail to improve, for Annual physical-labs 1-2 weeks prior.  Patient was given instructions and counseling regarding his condition or for health maintenance advice. Please see specific information pulled into the AVS if appropriate.       Electronically signed by CARLOS MANUEL Nogeura, 04/22/24, 8:11 AM EDT.

## 2024-04-22 NOTE — ASSESSMENT & PLAN NOTE
Hemoglobin A1c 11.6.  I will defer to endocrinology for management medication adjustment.  Patient has a follow-up 5/10/2024.  To lower cost, Humalog has been sent into his mail order pharmacy.  Discussed diet modifications.  Continue simvastatin.  Blood pressure controlled.  Discussed recommendation for yearly diabetic eye exam.  Positive microalbuminuria.  He was referred to nephrology in the past, but not scheduled.  Referral replaced today.  Check CMP.

## 2024-04-23 LAB
ALBUMIN SERPL-MCNC: 4.2 G/DL (ref 4.1–5.1)
ALBUMIN/GLOB SERPL: 1.6 {RATIO} (ref 1.2–2.2)
ALP SERPL-CCNC: 89 IU/L (ref 44–121)
ALT SERPL-CCNC: 79 IU/L (ref 0–44)
AST SERPL-CCNC: 29 IU/L (ref 0–40)
BILIRUB SERPL-MCNC: 0.3 MG/DL (ref 0–1.2)
BUN SERPL-MCNC: 15 MG/DL (ref 6–24)
BUN/CREAT SERPL: 20 (ref 9–20)
CALCIUM SERPL-MCNC: 10.3 MG/DL (ref 8.7–10.2)
CHLORIDE SERPL-SCNC: 99 MMOL/L (ref 96–106)
CO2 SERPL-SCNC: 24 MMOL/L (ref 20–29)
CREAT SERPL-MCNC: 0.75 MG/DL (ref 0.76–1.27)
EGFRCR SERPLBLD CKD-EPI 2021: 115 ML/MIN/1.73
GLOBULIN SER CALC-MCNC: 2.7 G/DL (ref 1.5–4.5)
GLUCOSE SERPL-MCNC: 297 MG/DL (ref 70–99)
POTASSIUM SERPL-SCNC: 4.8 MMOL/L (ref 3.5–5.2)
PROT SERPL-MCNC: 6.9 G/DL (ref 6–8.5)
SODIUM SERPL-SCNC: 136 MMOL/L (ref 134–144)

## 2024-04-25 DIAGNOSIS — E83.52 HYPERCALCEMIA: Primary | ICD-10-CM

## 2024-05-10 ENCOUNTER — OFFICE VISIT (OUTPATIENT)
Dept: ENDOCRINOLOGY | Age: 44
End: 2024-05-10
Payer: COMMERCIAL

## 2024-05-10 VITALS
HEART RATE: 93 BPM | DIASTOLIC BLOOD PRESSURE: 80 MMHG | WEIGHT: 284.2 LBS | TEMPERATURE: 96.9 F | HEIGHT: 71 IN | OXYGEN SATURATION: 95 % | BODY MASS INDEX: 39.79 KG/M2 | SYSTOLIC BLOOD PRESSURE: 134 MMHG

## 2024-05-10 DIAGNOSIS — E11.65 UNCONTROLLED TYPE 2 DIABETES MELLITUS WITH HYPERGLYCEMIA: Primary | ICD-10-CM

## 2024-05-10 DIAGNOSIS — E78.2 MIXED HYPERLIPIDEMIA: Chronic | ICD-10-CM

## 2024-05-10 RX ORDER — INSULIN LISPRO 100 [IU]/ML
14-23 INJECTION, SOLUTION INTRAVENOUS; SUBCUTANEOUS
Qty: 60 ML | Refills: 1 | Status: SHIPPED | OUTPATIENT
Start: 2024-05-10

## 2024-05-10 RX ORDER — INSULIN GLARGINE 300 U/ML
40-60 INJECTION, SOLUTION SUBCUTANEOUS 2 TIMES DAILY
Qty: 36 ML | Refills: 1 | Status: SHIPPED | OUTPATIENT
Start: 2024-05-10

## 2024-07-11 ENCOUNTER — OFFICE VISIT (OUTPATIENT)
Dept: ENDOCRINOLOGY | Age: 44
End: 2024-07-11
Payer: COMMERCIAL

## 2024-07-11 VITALS
OXYGEN SATURATION: 93 % | DIASTOLIC BLOOD PRESSURE: 80 MMHG | HEIGHT: 71 IN | SYSTOLIC BLOOD PRESSURE: 122 MMHG | TEMPERATURE: 97.1 F | WEIGHT: 282.2 LBS | BODY MASS INDEX: 39.51 KG/M2 | HEART RATE: 113 BPM

## 2024-07-11 DIAGNOSIS — E78.2 MIXED HYPERLIPIDEMIA: Chronic | ICD-10-CM

## 2024-07-11 DIAGNOSIS — E11.65 UNCONTROLLED TYPE 2 DIABETES MELLITUS WITH HYPERGLYCEMIA: Primary | ICD-10-CM

## 2024-07-11 RX ORDER — INSULIN LISPRO 100 [IU]/ML
10-23 INJECTION, SOLUTION INTRAVENOUS; SUBCUTANEOUS
Qty: 60 ML | Refills: 1 | Status: SHIPPED | OUTPATIENT
Start: 2024-07-11

## 2024-07-11 NOTE — PROGRESS NOTES
"Chief Complaint  Chief Complaint   Patient presents with    Diabetes     Type 2 : Pt doesn't have meter today, checks bs every morning, is not up to date on eye exam, no hx of retinopathy or neuropathy.         Subjective          History of Present Illness    Kenny Pavon 44 y.o. presents for a follow-up for Type 2 Diabetes    He was diagnosed with diabetes in 2020     Family history of diabetes: Grandparents and parents     Pt is on the following medications for their diabetes: Jardiance 25 mg daily, Humalog 10 units before each meal, metformin 1,000 mg twice daily and Toujeo 35 units twice daily     Glimepiride stopped when rapid acting insulin started         Denies diarrhea, constipation, chest pain, shortness of breath, vision changes or numbness and tingling in feet/hands.    Last eye exam was unknown - states doesn't have vision insurance     Pt does have nephropathy.  Patient is not currently taking ACE/ARB     Pt denies neuropathy.     Pt denies a history of CAD or CVA.    Last A1C in 04/24 was 11.6    Last microalbumin in 10/23 was positive           Blood Sugars    Blood glucoses are checked 1/day.    Fasting blood glucoses: 190s-200s    Pt denies/has episodes of hypoglycemia.        Pt refused CGM stating that they were \"uncomfortable\"           Hyperlipidemia     Pt is currently taking simvastatin 40 mg HS     Last lipid panel in 10/23 showed Total 186, HDL 40,  and Triglycerides 178           I have reviewed the patient's allergies, medicines, past medical hx, family hx and social hx.    Objective   Vital Signs:   /80   Pulse 113   Temp 97.1 °F (36.2 °C) (Temporal)   Ht 180.3 cm (70.98\")   Wt 128 kg (282 lb 3.2 oz)   SpO2 93%   BMI 39.38 kg/m²       Physical Exam   Physical Exam  Constitutional:       General: He is not in acute distress.     Appearance: Normal appearance. He is obese. He is not diaphoretic.   HENT:      Head: Normocephalic and atraumatic.   Eyes:      " General:         Right eye: No discharge.         Left eye: No discharge.   Skin:     General: Skin is warm and dry.   Neurological:      Mental Status: He is alert.   Psychiatric:         Mood and Affect: Mood normal.         Behavior: Behavior normal.                    Results Review:   Hemoglobin A1C   Date Value Ref Range Status   04/22/2024 11.6 (A) 4.5 - 5.7 % Final     Triglycerides   Date Value Ref Range Status   10/19/2023 178 (H) 0 - 150 mg/dL Final     HDL Cholesterol   Date Value Ref Range Status   10/19/2023 40 40 - 60 mg/dL Final     LDL Chol Calc (NIH)   Date Value Ref Range Status   10/19/2023 115 (H) 0 - 100 mg/dL Final     VLDL Cholesterol Bonifacio   Date Value Ref Range Status   10/19/2023 31 5 - 40 mg/dL Final     LDL/HDL RATIO   Date Value Ref Range Status   05/03/2022 3.2 0.0 - 3.6 ratio Final     Comment:                                         LDL/HDL Ratio                                              Men  Women                                1/2 Avg.Risk  1.0    1.5                                    Avg.Risk  3.6    3.2                                 2X Avg.Risk  6.2    5.0                                 3X Avg.Risk  8.0    6.1           Assessment and Plan {CC Problem List  Visit Diagnosis  ROS  Review (Popup)  Health Maintenance  Quality  BestPractice  Medications  SmartSets  SnapShot Encounters  Media :23  Diagnoses and all orders for this visit:    1. Uncontrolled type 2 diabetes mellitus with hyperglycemia (Primary)  -     HumaLOG KwikPen 100 UNIT/ML solution pen-injector; Inject 10-23 Units under the skin into the appropriate area as directed 3 (Three) Times a Day Before Meals.  Dispense: 60 mL; Refill: 1  -     Hemoglobin A1c  -     Comprehensive Metabolic Panel  -     Microalbumin / Creatinine Urine Ratio - Urine, Clean Catch    Pt did not go up on insulin dose as advised at last visit and is not checking blood sugars as advised either  Continue with Jardiance 25 mg  daily  Continue with Humalog 10 units before each meal  Continue with metformin 1,000 mg twice daily  Increase Toujeo 40 units twice daily  Please check blood sugars at least 3 times a day - in the morning when you get up, before lunch and before dinner  Check labs today      2. Mixed hyperlipidemia  -     Comprehensive Metabolic Panel  -     Lipid Panel    Check labs today  Continue with statin        Refills sent to pharmacy      Labs today  RTC in 3 months with me      Follow Up     Patient was given instructions and counseling regarding her condition or for health maintenance advice. Please see specific information pulled into the AVS if appropriate.                Farheen Garcia, APRN  07/11/24

## 2024-07-11 NOTE — PATIENT INSTRUCTIONS
Continue with Jardiance 25 mg daily  Continue with Humalog 10 units before each meal  Continue with metformin 1,000 mg twice daily  Increase Toujeo 40 units twice daily  Please check blood sugars at least 3 times a day - in the morning when you get up, before lunch and before dinner

## 2024-07-12 LAB
ALBUMIN SERPL-MCNC: 4.2 G/DL (ref 3.5–5.2)
ALBUMIN/GLOB SERPL: 1.6 G/DL
ALP SERPL-CCNC: 91 U/L (ref 39–117)
ALT SERPL-CCNC: 68 U/L (ref 1–41)
AST SERPL-CCNC: 44 U/L (ref 1–40)
BILIRUB SERPL-MCNC: 0.4 MG/DL (ref 0–1.2)
BUN SERPL-MCNC: 11 MG/DL (ref 6–20)
BUN/CREAT SERPL: 13.6 (ref 7–25)
CALCIUM SERPL-MCNC: 10 MG/DL (ref 8.6–10.5)
CHLORIDE SERPL-SCNC: 93 MMOL/L (ref 98–107)
CHOLEST SERPL-MCNC: 210 MG/DL (ref 0–200)
CO2 SERPL-SCNC: 21.8 MMOL/L (ref 22–29)
CREAT SERPL-MCNC: 0.81 MG/DL (ref 0.76–1.27)
EGFRCR SERPLBLD CKD-EPI 2021: 112.2 ML/MIN/1.73
GLOBULIN SER CALC-MCNC: 2.7 GM/DL
GLUCOSE SERPL-MCNC: 453 MG/DL (ref 65–99)
HBA1C MFR BLD: 11.5 % (ref 4.8–5.6)
HDLC SERPL-MCNC: 36 MG/DL (ref 40–60)
IMP & REVIEW OF LAB RESULTS: NORMAL
LDLC SERPL CALC-MCNC: 102 MG/DL (ref 0–100)
POTASSIUM SERPL-SCNC: 4.2 MMOL/L (ref 3.5–5.2)
PROT SERPL-MCNC: 6.9 G/DL (ref 6–8.5)
SODIUM SERPL-SCNC: 134 MMOL/L (ref 136–145)
TRIGL SERPL-MCNC: 428 MG/DL (ref 0–150)
UNABLE TO VOID: NORMAL
VLDLC SERPL CALC-MCNC: 72 MG/DL (ref 5–40)

## 2024-07-12 RX ORDER — ATORVASTATIN CALCIUM 40 MG/1
40 TABLET, FILM COATED ORAL NIGHTLY
Qty: 90 TABLET | Refills: 1 | Status: SHIPPED | OUTPATIENT
Start: 2024-07-12

## 2024-09-12 DIAGNOSIS — R74.8 ELEVATED LIVER ENZYMES: Primary | ICD-10-CM

## 2024-09-13 DIAGNOSIS — E11.65 UNCONTROLLED TYPE 2 DIABETES MELLITUS WITH HYPERGLYCEMIA: ICD-10-CM

## 2024-10-04 ENCOUNTER — OFFICE VISIT (OUTPATIENT)
Dept: ENDOCRINOLOGY | Age: 44
End: 2024-10-04
Payer: COMMERCIAL

## 2024-10-04 VITALS
WEIGHT: 190.2 LBS | DIASTOLIC BLOOD PRESSURE: 70 MMHG | BODY MASS INDEX: 26.63 KG/M2 | SYSTOLIC BLOOD PRESSURE: 130 MMHG | HEART RATE: 84 BPM | OXYGEN SATURATION: 97 % | HEIGHT: 71 IN

## 2024-10-04 DIAGNOSIS — E78.2 MIXED HYPERLIPIDEMIA: Chronic | ICD-10-CM

## 2024-10-04 DIAGNOSIS — E11.65 UNCONTROLLED TYPE 2 DIABETES MELLITUS WITH HYPERGLYCEMIA: Primary | ICD-10-CM

## 2024-10-04 RX ORDER — BLOOD SUGAR DIAGNOSTIC
STRIP MISCELLANEOUS
Qty: 300 EACH | Refills: 2 | Status: SHIPPED | OUTPATIENT
Start: 2024-10-04 | End: 2024-10-07

## 2024-10-04 RX ORDER — INSULIN LISPRO 100 [IU]/ML
28-35 INJECTION, SOLUTION INTRAVENOUS; SUBCUTANEOUS
Start: 2024-10-04

## 2024-10-04 RX ORDER — ATORVASTATIN CALCIUM 40 MG/1
40 TABLET, FILM COATED ORAL NIGHTLY
Qty: 90 TABLET | Refills: 1 | Status: SHIPPED | OUTPATIENT
Start: 2024-10-04

## 2024-10-04 RX ORDER — INSULIN GLARGINE 300 U/ML
50-60 INJECTION, SOLUTION SUBCUTANEOUS 2 TIMES DAILY
Start: 2024-10-04

## 2024-10-04 NOTE — PATIENT INSTRUCTIONS
Continue with Jardiance 25 mg daily  Increase Humalog 28 units before each meal  Continue with metformin 1,000 mg twice daily  Increase Toujeo 50 units twice daily      Change simvastatin to atorvastatin 40 mg at bedtime (cholesterol medication)

## 2024-10-04 NOTE — PROGRESS NOTES
"Chief Complaint  Chief Complaint   Patient presents with    Diabetes    Hyperglycemia       Subjective          History of Present Illness    Kenny Pavon 44 y.o.  presents for a follow-up for Type 2 Diabetes     He was diagnosed with diabetes in 2020      Family history of diabetes: Grandparents and parents      Pt is on the following medications for their diabetes: Jardiance 25 mg daily, Humalog 25 units before each meal, metformin 1,000 mg twice daily and Toujeo 45 units twice daily        Glimepiride stopped when rapid acting insulin started       Pt complains of headache with high BGs    Denies diarrhea, constipation, chest pain, shortness of breath, vision lose or numbness and tingling in feet/hands.    Last eye exam was unknown - states doesn't have vision insurance     Pt does have nephropathy.  Patient is not currently taking ACE/ARB     Pt denies neuropathy.     Pt denies a history of CAD or CVA.    Last A1C in 07/24 was 11.5    Last microalbumin in 10/23 was positive          Blood Sugars    Blood glucoses are checked 1-2/day.    Fasting blood glucoses: below 200    Pre-meal blood glucoses: 175-200 before lunch and 200 before dinner    Pt denies episodes of hypoglycemia.          Pt refused CGM stating that they were \"uncomfortable\"           Hyperlipidemia     Pt is currently taking simvastatin - never changed to atorvastatin    Last lipid panel in 07/24 showed Total 210, HDL 36,  and Triglycerides 428            I have reviewed the patient's allergies, medicines, past medical hx, family hx and social hx.    Objective   Vital Signs:   /70 (BP Location: Right arm, Patient Position: Sitting, Cuff Size: Adult)   Pulse 84   Ht 180.3 cm (70.98\")   Wt 86.3 kg (190 lb 3.2 oz)   SpO2 97%   BMI 26.54 kg/m²       Physical Exam   Physical Exam  Constitutional:       General: He is not in acute distress.     Appearance: Normal appearance. He is not diaphoretic.   HENT:      Head: " Normocephalic and atraumatic.   Eyes:      General:         Right eye: No discharge.         Left eye: No discharge.   Skin:     General: Skin is warm and dry.   Neurological:      Mental Status: He is alert.   Psychiatric:         Mood and Affect: Mood normal.         Behavior: Behavior normal.                    Results Review:   Hemoglobin A1C   Date Value Ref Range Status   07/11/2024 11.50 (H) 4.80 - 5.60 % Final     Comment:     Hemoglobin A1C Ranges:  Increased Risk for Diabetes  5.7% to 6.4%  Diabetes                     >= 6.5%  Diabetic Goal                < 7.0%     04/22/2024 11.6 (A) 4.5 - 5.7 % Final     Triglycerides   Date Value Ref Range Status   07/11/2024 428 (H) 0 - 150 mg/dL Final     HDL Cholesterol   Date Value Ref Range Status   07/11/2024 36 (L) 40 - 60 mg/dL Final     LDL Chol Calc (NIH)   Date Value Ref Range Status   07/11/2024 102 (H) 0 - 100 mg/dL Final     VLDL Cholesterol Bonifacio   Date Value Ref Range Status   07/11/2024 72 (H) 5 - 40 mg/dL Final     LDL/HDL RATIO   Date Value Ref Range Status   05/03/2022 3.2 0.0 - 3.6 ratio Final     Comment:                                         LDL/HDL Ratio                                              Men  Women                                1/2 Avg.Risk  1.0    1.5                                    Avg.Risk  3.6    3.2                                 2X Avg.Risk  6.2    5.0                                 3X Avg.Risk  8.0    6.1           Assessment and Plan {CC Problem List  Visit Diagnosis  ROS  Review (Popup)  Health Maintenance  Quality  BestPractice  Medications  SmartSets  SnapShot Encounters  Media :23  Diagnoses and all orders for this visit:    1. Uncontrolled type 2 diabetes mellitus with hyperglycemia (Primary)  -     glucose blood (Accu-Chek Deirdre Plus) test strip; To check 3 - 4 times a day  Dispense: 300 each; Refill: 2  -     Hemoglobin A1c  -     Basic Metabolic Panel  -     HumaLOG KwikPen 100 UNIT/ML solution  pen-injector; Inject 28-35 Units under the skin into the appropriate area as directed 3 (Three) Times a Day Before Meals.  -     Insulin Glargine, 1 Unit Dial, (Toujeo SoloStar) 300 UNIT/ML solution pen-injector injection; Inject 50-60 Units under the skin into the appropriate area as directed 2 (Two) Times a Day.    Continue with Jardiance 25 mg daily  Increase Humalog 28 units before each meal  Continue with metformin 1,000 mg twice daily  Increase Toujeo 50 units twice daily  Continue with BG checks  Pt states that he did sign up for vision coverage and is going to make an appointment for diabetic eye exam.  Asked for faxed report when that is done.  Check labs today      2. Mixed hyperlipidemia  -     atorvastatin (LIPITOR) 40 MG tablet; Take 1 tablet by mouth Every Night.  Dispense: 90 tablet; Refill: 1  -     Basic Metabolic Panel  -     Lipid Panel     Pt states that he never changed from simvastatin to atorvastatin  Stop simvastatin  Start atorvastatin 40 mg HS          Labs today  RTC in 3 months with me      Follow Up     Patient was given instructions and counseling regarding her condition or for health maintenance advice. Please see specific information pulled into the AVS if appropriate.                Farheen Garcia, APRN  10/04/24

## 2024-10-05 LAB
BUN SERPL-MCNC: 11 MG/DL (ref 6–20)
BUN/CREAT SERPL: 17.2 (ref 7–25)
CALCIUM SERPL-MCNC: 9.8 MG/DL (ref 8.6–10.5)
CHLORIDE SERPL-SCNC: 100 MMOL/L (ref 98–107)
CHOLEST SERPL-MCNC: 198 MG/DL (ref 0–200)
CO2 SERPL-SCNC: 25.1 MMOL/L (ref 22–29)
CREAT SERPL-MCNC: 0.64 MG/DL (ref 0.76–1.27)
EGFRCR SERPLBLD CKD-EPI 2021: 120.5 ML/MIN/1.73
GLUCOSE SERPL-MCNC: 231 MG/DL (ref 65–99)
HBA1C MFR BLD: 10.1 % (ref 4.8–5.6)
HDLC SERPL-MCNC: 31 MG/DL (ref 40–60)
IMP & REVIEW OF LAB RESULTS: NORMAL
LDLC SERPL CALC-MCNC: 111 MG/DL (ref 0–100)
POTASSIUM SERPL-SCNC: 4.1 MMOL/L (ref 3.5–5.2)
SODIUM SERPL-SCNC: 137 MMOL/L (ref 136–145)
TRIGL SERPL-MCNC: 326 MG/DL (ref 0–150)
VLDLC SERPL CALC-MCNC: 56 MG/DL (ref 5–40)

## 2024-10-07 ENCOUNTER — TELEPHONE (OUTPATIENT)
Dept: ENDOCRINOLOGY | Age: 44
End: 2024-10-07
Payer: COMMERCIAL

## 2024-10-07 RX ORDER — LANCETS 30 GAUGE
EACH MISCELLANEOUS
Qty: 400 EACH | Refills: 5 | Status: SHIPPED | OUTPATIENT
Start: 2024-10-07

## 2024-10-07 RX ORDER — SYRING-NEEDL,DISP,INSUL,0.3 ML 30 GX5/16"
SYRINGE, EMPTY DISPOSABLE MISCELLANEOUS
Qty: 1 EACH | Refills: 0 | Status: SHIPPED | OUTPATIENT
Start: 2024-10-07

## 2024-10-07 RX ORDER — BLOOD-GLUCOSE METER
1 KIT MISCELLANEOUS 4 TIMES DAILY
Qty: 1 EACH | Refills: 0 | Status: SHIPPED | OUTPATIENT
Start: 2024-10-07

## 2024-10-24 DIAGNOSIS — E55.9 VITAMIN D DEFICIENCY: ICD-10-CM

## 2024-10-24 DIAGNOSIS — E11.65 TYPE 2 DIABETES MELLITUS WITH HYPERGLYCEMIA, WITHOUT LONG-TERM CURRENT USE OF INSULIN: ICD-10-CM

## 2024-10-24 DIAGNOSIS — Z13.29 SCREENING FOR THYROID DISORDER: Primary | ICD-10-CM

## 2024-10-24 DIAGNOSIS — Z13.0 SCREENING FOR DEFICIENCY ANEMIA: ICD-10-CM

## 2024-10-25 ENCOUNTER — OFFICE VISIT (OUTPATIENT)
Dept: FAMILY MEDICINE CLINIC | Facility: CLINIC | Age: 44
End: 2024-10-25
Payer: COMMERCIAL

## 2024-10-25 VITALS
TEMPERATURE: 97 F | HEIGHT: 71 IN | BODY MASS INDEX: 40.32 KG/M2 | OXYGEN SATURATION: 99 % | HEART RATE: 79 BPM | DIASTOLIC BLOOD PRESSURE: 74 MMHG | WEIGHT: 288 LBS | SYSTOLIC BLOOD PRESSURE: 120 MMHG

## 2024-10-25 DIAGNOSIS — Z00.00 ENCOUNTER FOR HEALTH MAINTENANCE EXAMINATION IN ADULT: Primary | ICD-10-CM

## 2024-10-25 DIAGNOSIS — E11.65 TYPE 2 DIABETES MELLITUS WITH HYPERGLYCEMIA, WITHOUT LONG-TERM CURRENT USE OF INSULIN: ICD-10-CM

## 2024-10-25 DIAGNOSIS — Z23 NEED FOR VACCINATION: ICD-10-CM

## 2024-10-25 DIAGNOSIS — Z13.29 SCREENING FOR THYROID DISORDER: ICD-10-CM

## 2024-10-25 DIAGNOSIS — R74.8 ELEVATED LIVER ENZYMES: ICD-10-CM

## 2024-10-25 DIAGNOSIS — Z13.0 SCREENING FOR DEFICIENCY ANEMIA: ICD-10-CM

## 2024-10-25 DIAGNOSIS — E55.9 VITAMIN D DEFICIENCY: ICD-10-CM

## 2024-10-25 PROCEDURE — 90656 IIV3 VACC NO PRSV 0.5 ML IM: CPT | Performed by: NURSE PRACTITIONER

## 2024-10-25 PROCEDURE — 90471 IMMUNIZATION ADMIN: CPT | Performed by: NURSE PRACTITIONER

## 2024-10-25 PROCEDURE — 90472 IMMUNIZATION ADMIN EACH ADD: CPT | Performed by: NURSE PRACTITIONER

## 2024-10-25 PROCEDURE — 99396 PREV VISIT EST AGE 40-64: CPT | Performed by: NURSE PRACTITIONER

## 2024-10-25 PROCEDURE — 90715 TDAP VACCINE 7 YRS/> IM: CPT | Performed by: NURSE PRACTITIONER

## 2024-10-25 NOTE — PROGRESS NOTES
Chief Complaint  Annual Exam (Physical - labs 10/4/24 - VELVET 0 PHQ 0)    Subjective        Kenny Pavon presents to [facility]  History of Present Illness  Kenny Pavon is a 44 y.o. male who presents for his annual physical exam. He has a history of hyperlipidemia, uncontrolled type 2 diabetes, and anxiety.  He has been following with endocrinology. He is taking vitamin d.     Annual Exam.  Diet: He has cut out fast food and trying to increase water. He is limiting carbohydrates. He has met with diabatic nutritionist in the past.   Exercise: Walking in the park.   : No urinary symptoms.   Immunizations: Due for Tdap, influenza vaccine, Prevnar 20, hepatitis B, and COVID-19 booster.  Colon cancer screening: His father has a history of polyps found in his 60s. GI symptoms improving with diet.   Sleep/mental health:  No issues with sleep. He gets about 10 hours of sleep a night. No anxiety or depression.   Sexual health: Not currently sexually active.   Social history: No alcohol use on weekly basis. No smoking or drug use.   Vision/dental:He has not had a diabetic eye exam. He is enrolled with vision insurance now. He is scheduled with his dentist.   Family history: As listed below. Mother did have a stroke this year.     Health Maintenance   Topic Date Due    DIABETIC EYE EXAM  Never done    DIABETIC FOOT EXAM  10/19/2024    URINE MICROALBUMIN  10/19/2024    BMI FOLLOWUP  10/19/2024    COVID-19 Vaccine (4 - 2023-24 season) 01/14/2025 (Originally 9/1/2024)    Pneumococcal Vaccine 0-64 (1 of 2 - PCV) 10/25/2025 (Originally 1/20/1987)    Hepatitis B (1 of 3 - 19+ 3-dose series) 10/27/2025 (Originally 1/20/2000)    HEMOGLOBIN A1C  04/04/2025    LIPID PANEL  10/04/2025    ANNUAL PHYSICAL  10/25/2025    TDAP/TD VACCINES (3 - Td or Tdap) 10/25/2034    HEPATITIS C SCREENING  Completed    INFLUENZA VACCINE  Completed          Family History   Problem Relation Age of Onset     "Diabetes Mother     Stroke Mother     Iron deficiency Mother     Diabetes Father     Colon polyps Father     Cancer Father         Colon cancer    Hypertension Sister     Diabetes Sister     Hypertension Sister     Diabetes Sister     No Known Problems Brother     Breast cancer Maternal Grandmother     Lung cancer Maternal Grandfather     Diabetes Paternal Grandmother     Diabetes Paternal Grandfather          Objective   Vital Signs:  /74   Pulse 79   Temp 97 °F (36.1 °C)   Ht 180.3 cm (70.98\")   Wt 131 kg (288 lb)   SpO2 99%   BMI 40.19 kg/m²   Estimated body mass index is 40.19 kg/m² as calculated from the following:    Height as of this encounter: 180.3 cm (70.98\").    Weight as of this encounter: 131 kg (288 lb).          Physical Exam  Vitals reviewed.   Constitutional:       General: He is not in acute distress.     Appearance: Normal appearance. He is well-developed. He is not ill-appearing, toxic-appearing or diaphoretic.   HENT:      Head: Normocephalic and atraumatic.   Eyes:      General: No scleral icterus.        Right eye: No discharge.         Left eye: No discharge.      Extraocular Movements: Extraocular movements intact.   Neck:      Thyroid: No thyroid mass, thyromegaly or thyroid tenderness.   Cardiovascular:      Rate and Rhythm: Normal rate and regular rhythm.      Heart sounds: Normal heart sounds.   Pulmonary:      Effort: Pulmonary effort is normal. No respiratory distress.      Breath sounds: Normal breath sounds. No wheezing.   Abdominal:      General: Bowel sounds are normal.      Palpations: Abdomen is soft.   Musculoskeletal:         General: Normal range of motion.      Cervical back: Normal range of motion.      Right lower leg: No edema.      Left lower leg: No edema.   Skin:     General: Skin is warm.   Neurological:      General: No focal deficit present.      Mental Status: He is alert and oriented to person, place, and time.      Motor: No weakness.      Gait: Gait " normal.   Psychiatric:         Mood and Affect: Mood normal.         Behavior: Behavior normal.        Result Review :    Common labs          7/11/2024    15:56 8/30/2024    12:58 10/4/2024    15:53   Common Labs   Glucose 453  256  231    BUN 11  9  11    Creatinine 0.81  0.67  0.64    Sodium 134  136  137    Potassium 4.2  4.2  4.1    Chloride 93  98  100    Calcium 10.0  9.5  9.8    Total Protein 6.9  6.6     Albumin 4.2  3.9     Total Bilirubin 0.4  0.3     Alkaline Phosphatase 91  83     AST (SGOT) 44  81     ALT (SGPT) 68  92     Total Cholesterol 210   198    Triglycerides 428   326    HDL Cholesterol 36   31    LDL Cholesterol  102   111    Hemoglobin A1C 11.50   10.10      Progress Notes by Farheen Garcia APRN (10/04/2024 16:00)             Assessment and Plan   Diagnoses and all orders for this visit:    1. Encounter for health maintenance examination in adult (Primary)    2. Need for vaccination  -     Fluzone >6mos  -     Tdap Vaccine Greater Than or Equal To 8yo IM    3. Elevated liver enzymes  -     US Liver; Future      Preventative counseling: Patient is a pleasant 44 year old male who presents for his annual physical exam. His vital signs are within normal limits. Recommend 150 minutes of moderate intensity physical activity per week and reducing carbohydrate intake. He received TDAP and influenza vaccine. Due Prevanr 20 and COVID-19 booster vaccine.  Due for diabetic eye exam.  Patient plans to schedule when he receives his insurance card.  Due for dental visit.  Recommend colonoscopy at 46 given family history.  He plans to have lab work at [facility].         Follow Up   Return in about 6 months (around 4/25/2025) for Recheck.  Patient was given instructions and counseling regarding his condition or for health maintenance advice. Please see specific information pulled into the AVS if appropriate.       Electronically signed by CARLOS MANUEL Noguera, 10/25/24, 10:21 AM EDT.

## 2024-11-30 DIAGNOSIS — E11.65 UNCONTROLLED TYPE 2 DIABETES MELLITUS WITH HYPERGLYCEMIA: ICD-10-CM

## 2024-12-02 RX ORDER — INSULIN GLARGINE 300 U/ML
50 INJECTION, SOLUTION SUBCUTANEOUS 2 TIMES DAILY
Qty: 30 ML | Refills: 0 | Status: SHIPPED | OUTPATIENT
Start: 2024-12-02

## 2024-12-02 NOTE — TELEPHONE ENCOUNTER
Rx Refill Note  Requested Prescriptions     Pending Prescriptions Disp Refills    Toujeo SoloStar 300 UNIT/ML solution pen-injector injection [Pharmacy Med Name: Toujeo SoloStar 300 UNIT/ML Subcutaneous Solution Pen-injector] 36 mL 3     Sig: INJECT 40 TO 60 UNITS  SUBCUTANEOUSLY INTO THE  APPROPRIATE AREA AS DIRECTED  TWICE DAILY      Last office visit with prescribing clinician: 10/4/2024   Last telemedicine visit with prescribing clinician: Visit date not found   Next office visit with prescribing clinician: 1/6/2025                         Would you like a call back once the refill request has been completed: [] Yes [] No    If the office needs to give you a call back, can they leave a voicemail: [] Yes [] No    Carrie Thorne  12/02/24, 07:40 EST

## 2024-12-03 DIAGNOSIS — E11.65 UNCONTROLLED TYPE 2 DIABETES MELLITUS WITH HYPERGLYCEMIA: ICD-10-CM

## 2024-12-03 RX ORDER — INSULIN LISPRO 100 [IU]/ML
28 INJECTION, SOLUTION INTRAVENOUS; SUBCUTANEOUS
Qty: 75 ML | Refills: 1 | Status: SHIPPED | OUTPATIENT
Start: 2024-12-03

## 2024-12-03 NOTE — TELEPHONE ENCOUNTER
Rx Refill Note  Requested Prescriptions     Pending Prescriptions Disp Refills    HumaLOG KwikPen 100 UNIT/ML solution pen-injector [Pharmacy Med Name: HumaLOG KwikPen 100 UNIT/ML Subcutaneous Solution Pen-injector] 75 mL 3     Sig: INJECT SUBCUTANEOUSLY 10 TO 23  UNITS INTO THE APPROPRIATE AREA  AS DIRECTED 3 TIMES DAILY BEFORE MEALS      Last office visit with prescribing clinician: 10/4/2024   Last telemedicine visit with prescribing clinician: Visit date not found   Next office visit with prescribing clinician: 1/6/2025                         Would you like a call back once the refill request has been completed: [] Yes [] No    If the office needs to give you a call back, can they leave a voicemail: [] Yes [] No    Carrie Thorne  12/03/24, 07:30 EST

## 2025-01-08 ENCOUNTER — TELEPHONE (OUTPATIENT)
Dept: ENDOCRINOLOGY | Age: 45
End: 2025-01-08
Payer: COMMERCIAL

## 2025-01-08 NOTE — TELEPHONE ENCOUNTER
Left voicemail to reschedule appt with ___Farheen Garcia______. Office closed due to the weather on 1/6

## 2025-02-24 DIAGNOSIS — E11.65 UNCONTROLLED TYPE 2 DIABETES MELLITUS WITH HYPERGLYCEMIA: ICD-10-CM

## 2025-02-24 RX ORDER — INSULIN GLARGINE 300 U/ML
INJECTION, SOLUTION SUBCUTANEOUS
Qty: 27 ML | Refills: 3 | OUTPATIENT
Start: 2025-02-24

## 2025-02-24 NOTE — TELEPHONE ENCOUNTER
Rx Refill Note  Requested Prescriptions     Pending Prescriptions Disp Refills    Toujeo SoloStar 300 UNIT/ML solution pen-injector injection [Pharmacy Med Name: Toujeo SoloStar 300 UNIT/ML Subcutaneous Solution Pen-injector] 27 mL 3     Sig: INJECT 50 UNITS SUBCUTANEOUSLY  INTO THE APPROPRIATE AREA AS  DIRECTED TWICE DAILY      Last office visit with prescribing clinician: 10/4/2024   Last telemedicine visit with prescribing clinician: Visit date not found   Next office visit with prescribing clinician: Visit date not found                         Would you like a call back once the refill request has been completed: [] Yes [] No    If the office needs to give you a call back, can they leave a voicemail: [] Yes [] No    Carrie Thorne  02/24/25, 07:28 EST

## 2025-03-01 DIAGNOSIS — E78.2 MIXED HYPERLIPIDEMIA: Chronic | ICD-10-CM

## 2025-03-03 RX ORDER — ATORVASTATIN CALCIUM 40 MG/1
40 TABLET, FILM COATED ORAL
Qty: 90 TABLET | Refills: 3 | OUTPATIENT
Start: 2025-03-03

## 2025-03-03 NOTE — TELEPHONE ENCOUNTER
Rx Refill Note  Requested Prescriptions     Pending Prescriptions Disp Refills    atorvastatin (LIPITOR) 40 MG tablet [Pharmacy Med Name: Atorvastatin Calcium 40 MG Oral Tablet] 90 tablet 3     Sig: TAKE 1 TABLET BY MOUTH AT NIGHT      Last office visit with prescribing clinician: 10/4/2024   Last telemedicine visit with prescribing clinician: Visit date not found   Next office visit with prescribing clinician: Visit date not found                         Would you like a call back once the refill request has been completed: [] Yes [] No    If the office needs to give you a call back, can they leave a voicemail: [] Yes [] No    Carrie Thorne  03/03/25, 07:58 EST

## 2025-03-16 DIAGNOSIS — E11.65 UNCONTROLLED TYPE 2 DIABETES MELLITUS WITH HYPERGLYCEMIA: ICD-10-CM

## 2025-03-17 RX ORDER — INSULIN LISPRO 100 [IU]/ML
INJECTION, SOLUTION INTRAVENOUS; SUBCUTANEOUS
Qty: 90 ML | Refills: 3 | OUTPATIENT
Start: 2025-03-17

## 2025-03-17 NOTE — TELEPHONE ENCOUNTER
Rx Refill Note  Requested Prescriptions     Pending Prescriptions Disp Refills    HumaLOG KwikPen 100 UNIT/ML solution pen-injector [Pharmacy Med Name: HumaLOG KwikPen 100 UNIT/ML Subcutaneous Solution Pen-injector] 90 mL 3     Sig: INJECT 28 UNITS SUBCUTANEOUSLY  INTO THE APPROPRIATE AREA AS  DIRECTED 3 TIMES DAILY BEFORE  MEALS      Last office visit with prescribing clinician: 10/4/2024   Last telemedicine visit with prescribing clinician: Visit date not found   Next office visit with prescribing clinician: Visit date not found                         Would you like a call back once the refill request has been completed: [] Yes [] No    If the office needs to give you a call back, can they leave a voicemail: [] Yes [] No    Carrie Thorne  03/17/25, 07:47 EDT

## 2025-06-30 DIAGNOSIS — E11.65 UNCONTROLLED TYPE 2 DIABETES MELLITUS WITH HYPERGLYCEMIA: ICD-10-CM

## 2025-06-30 RX ORDER — INSULIN GLARGINE 300 U/ML
50 INJECTION, SOLUTION SUBCUTANEOUS 2 TIMES DAILY
Qty: 10 ML | Refills: 0 | Status: SHIPPED | OUTPATIENT
Start: 2025-06-30

## 2025-06-30 RX ORDER — INSULIN GLARGINE 300 U/ML
50 INJECTION, SOLUTION SUBCUTANEOUS 2 TIMES DAILY
Qty: 30 ML | Refills: 0 | OUTPATIENT
Start: 2025-06-30

## 2025-06-30 RX ORDER — INSULIN LISPRO 100 [IU]/ML
28 INJECTION, SOLUTION INTRAVENOUS; SUBCUTANEOUS
Qty: 75 ML | Refills: 1 | OUTPATIENT
Start: 2025-06-30

## 2025-06-30 RX ORDER — INSULIN LISPRO 100 [IU]/ML
28 INJECTION, SOLUTION INTRAVENOUS; SUBCUTANEOUS
Qty: 30 ML | Refills: 0 | Status: SHIPPED | OUTPATIENT
Start: 2025-06-30

## 2025-06-30 NOTE — TELEPHONE ENCOUNTER
Caller: Kenny Pavon    Relationship: Self    Best call back number: [phone]    Requested Prescriptions:   Requested Prescriptions     Pending Prescriptions Disp Refills    Insulin Glargine, 1 Unit Dial, (Toujeo SoloStar) 300 UNIT/ML solution pen-injector injection 30 mL 0     Sig: Inject 50 Units under the skin into the appropriate area as directed 2 (Two) Times a Day.    HumaLOG KwikPen 100 UNIT/ML solution pen-injector 75 mL 1     Sig: Inject 28 Units under the skin into the appropriate area as directed 3 (Three) Times a Day Before Meals.        Pharmacy where request should be sent: [address] [phone] [facility] [phone]      Last office visit with prescribing clinician: 10/25/2024   Last telemedicine visit with prescribing clinician: Visit date not found   Next office visit with prescribing clinician: Visit date not found     Additional details provided by patient: HAS 2 BOXES OF EACH REMAINING    Does the patient have less than a 3 day supply:  [] Yes  [x] No    Would you like a call back once the refill request has been completed: [] Yes [x] No    If the office needs to give you a call back, can they leave a voicemail: [] Yes [x] No    Angela Bey   06/30/25 08:43 EDT

## 2025-06-30 NOTE — TELEPHONE ENCOUNTER
Rx Refill Note  Requested Prescriptions     Pending Prescriptions Disp Refills    Insulin Glargine, 1 Unit Dial, (Toujeo SoloStar) 300 UNIT/ML solution pen-injector injection 30 mL 0     Sig: Inject 50 Units under the skin into the appropriate area as directed 2 (Two) Times a Day.    HumaLOG KwikPen 100 UNIT/ML solution pen-injector 75 mL 1     Sig: Inject 28 Units under the skin into the appropriate area as directed 3 (Three) Times a Day Before Meals.      Last office visit with prescribing clinician: 10/25/2024   Last telemedicine visit with prescribing clinician: Visit date not found   Next office visit with prescribing clinician: 10/29/2025                         Would you like a call back once the refill request has been completed: [] Yes [] No    If the office needs to give you a call back, can they leave a voicemail: [] Yes [] No    Mercy Mayo MA  06/30/25, 08:46 EDT

## 2025-07-26 DIAGNOSIS — E11.65 UNCONTROLLED TYPE 2 DIABETES MELLITUS WITH HYPERGLYCEMIA: ICD-10-CM

## 2025-07-28 RX ORDER — INSULIN LISPRO 100 [IU]/ML
INJECTION, SOLUTION INTRAVENOUS; SUBCUTANEOUS
Qty: 30 ML | Refills: 0 | Status: SHIPPED | OUTPATIENT
Start: 2025-07-28

## 2025-07-28 NOTE — TELEPHONE ENCOUNTER
Rx Refill Note  Requested Prescriptions     Pending Prescriptions Disp Refills    HumaLOG KwikPen 100 UNIT/ML solution pen-injector [Pharmacy Med Name: HumaLOG KwikPen 100 UNIT/ML Subcutaneous Solution Pen-injector] 30 mL 9     Sig: INJECT 28 UNITS SUBCUTANEOUSLY  INTO THE APPROPRIATE AREA AS  DIRECTED 3 TIMES DAILY BEFORE  MEALS      Last office visit with prescribing clinician: 10/25/2024   Last telemedicine visit with prescribing clinician: Visit date not found   Next office visit with prescribing clinician: 10/29/2025                         Would you like a call back once the refill request has been completed: [] Yes [] No    If the office needs to give you a call back, can they leave a voicemail: [] Yes [] No    Calin Roe CMA/KIRILL  07/28/25, 07:52 EDT